# Patient Record
Sex: MALE | Race: OTHER | Employment: UNEMPLOYED | URBAN - METROPOLITAN AREA
[De-identification: names, ages, dates, MRNs, and addresses within clinical notes are randomized per-mention and may not be internally consistent; named-entity substitution may affect disease eponyms.]

---

## 2021-02-02 ENCOUNTER — APPOINTMENT (OUTPATIENT)
Dept: GENERAL RADIOLOGY | Facility: HOSPITAL | Age: 41
End: 2021-02-02
Attending: EMERGENCY MEDICINE

## 2021-02-02 ENCOUNTER — APPOINTMENT (OUTPATIENT)
Dept: CT IMAGING | Facility: HOSPITAL | Age: 41
End: 2021-02-02
Attending: EMERGENCY MEDICINE

## 2021-02-02 PROCEDURE — 70450 CT HEAD/BRAIN W/O DYE: CPT | Performed by: EMERGENCY MEDICINE

## 2021-02-02 PROCEDURE — 71045 X-RAY EXAM CHEST 1 VIEW: CPT | Performed by: EMERGENCY MEDICINE

## 2021-02-03 NOTE — ED NOTES
used to update patient on plan of care, informed he is to remain in the ED overnight d/t ETOH level.  Pt states he understands

## 2021-02-03 NOTE — ED INITIAL ASSESSMENT (HPI)
Pt arrived via ems, pt Persian speaking,  video phone used. Pt reports etoh tonight, denies injury/trauma. States he wants to go home. Using his phone to call friend/family.

## 2021-02-03 NOTE — ED INITIAL ASSESSMENT (HPI)
Brought to hospital per EMS from [facility], was found on his back, states 7 beers. Denies any complaints.

## 2021-02-03 NOTE — ED NOTES
Assumed care of patient, Genexpert swab done and sent. Pt awake, speaking broken [place]

## 2021-02-03 NOTE — ED NOTES
Spoke with Ria in registration, will come and register patient so he can be escorted to [facility] to call a taxi. Ok per Dr. Darlene Tobias for patient to take a taxi. Pt is AAOX3 and has steady gait.

## 2021-02-03 NOTE — ED NOTES
With use of  patient was reevaluated, denies suicidal or homicidal ideations. Was offered evaluation and assistance for his alcohol abuse which he declined.   Patient is now clinically sober, slightly tachycardic and slightly hypertensive, mild

## 2021-02-03 NOTE — ED NOTES
Patient on cart easily redirectable. Patient remains on IV fluids but water provided as well as urinal bedside due to patients ETOH levels, unable to ambulate at this time.

## 2021-02-03 NOTE — ED PROVIDER NOTES
Patient Seen in: [facility] Emergency Department      History   Patient presents with:  Alcohol Intoxication    Stated Complaint: ETOH    HPI/Subjective:   HPI    History is somewhat limited. Attained via [employer] [place] .   Reportedly p Chest: No chest wall tenderness or bruising  Abdomen: Soft, nontender, nondistended. No bruising   back: No midline thoracic or lumbar tenderness. No bruising no flank tenderness  Extremities: No edema.   Neuro: Cranial nerves II through XII are intact SCREEN 7 W/OUT CONFIRMATION, URINE   SARS-COV-2/FLU A AND B/RSV BY PCR (GENEXPERT)     EKG    Rate, intervals and axes as noted on EKG Report.   Rate: 149  Rhythm: Sinus Rhythm  Reading: Abnormal due to rate no ischemic changes         Chest x-ray: Low lung

## 2024-11-17 ENCOUNTER — HOSPITAL ENCOUNTER (INPATIENT)
Facility: HOSPITAL | Age: 44
LOS: 12 days | Discharge: HOME | End: 2024-11-29
Attending: EMERGENCY MEDICINE | Admitting: INTERNAL MEDICINE

## 2024-11-17 ENCOUNTER — APPOINTMENT (OUTPATIENT)
Dept: CT IMAGING | Facility: HOSPITAL | Age: 44
End: 2024-11-17
Attending: EMERGENCY MEDICINE

## 2024-11-17 DIAGNOSIS — S00.83XA CONTUSION OF FACE, INITIAL ENCOUNTER: ICD-10-CM

## 2024-11-17 DIAGNOSIS — F10.920 ALCOHOLIC INTOXICATION WITHOUT COMPLICATION (HCC): ICD-10-CM

## 2024-11-17 DIAGNOSIS — S06.6XAA TRAUMATIC SUBARACHNOID HEMORRHAGE WITH UNKNOWN LOSS OF CONSCIOUSNESS STATUS, INITIAL ENCOUNTER (HCC): Primary | ICD-10-CM

## 2024-11-17 DIAGNOSIS — S02.2XXA CLOSED FRACTURE OF NASAL BONE, INITIAL ENCOUNTER: ICD-10-CM

## 2024-11-17 PROBLEM — F10.929 ALCOHOLIC INTOXICATION WITH COMPLICATION (HCC): Status: ACTIVE | Noted: 2024-11-17

## 2024-11-17 PROBLEM — E87.6 HYPOKALEMIA: Status: ACTIVE | Noted: 2024-11-17

## 2024-11-17 PROBLEM — D69.6 THROMBOCYTOPENIA (HCC): Status: ACTIVE | Noted: 2024-11-17

## 2024-11-17 LAB
ALBUMIN SERPL-MCNC: 3.8 G/DL (ref 3.2–4.8)
ALBUMIN/GLOB SERPL: 1 {RATIO} (ref 1–2)
ALP LIVER SERPL-CCNC: 130 U/L
ALT SERPL-CCNC: 58 U/L
AMPHET UR QL SCN: NEGATIVE
ANION GAP SERPL CALC-SCNC: 11 MMOL/L (ref 0–18)
ANTIBODY SCREEN: NEGATIVE
APTT PPP: 28.5 SECONDS (ref 23–36)
AST SERPL-CCNC: 117 U/L (ref ?–34)
ATRIAL RATE: 97 BPM
BASOPHILS # BLD AUTO: 0.09 X10(3) UL (ref 0–0.2)
BASOPHILS NFR BLD AUTO: 0.9 %
BENZODIAZ UR QL SCN: NEGATIVE
BILIRUB SERPL-MCNC: 0.6 MG/DL (ref 0.3–1.2)
BUN BLD-MCNC: 8 MG/DL (ref 9–23)
CALCIUM BLD-MCNC: 8.6 MG/DL (ref 8.7–10.4)
CANNABINOIDS UR QL SCN: NEGATIVE
CHLORIDE SERPL-SCNC: 105 MMOL/L (ref 98–112)
CO2 SERPL-SCNC: 23 MMOL/L (ref 21–32)
COCAINE UR QL: NEGATIVE
CREAT BLD-MCNC: 0.61 MG/DL
CREAT UR-SCNC: 58.7 MG/DL
EGFRCR SERPLBLD CKD-EPI 2021: 122 ML/MIN/1.73M2 (ref 60–?)
EOSINOPHIL # BLD AUTO: 0 X10(3) UL (ref 0–0.7)
EOSINOPHIL NFR BLD AUTO: 0 %
ERYTHROCYTE [DISTWIDTH] IN BLOOD BY AUTOMATED COUNT: 18 %
ETHANOL SERPL-MCNC: 379 MG/DL (ref ?–3)
FENTANYL UR QL SCN: NEGATIVE
GLOBULIN PLAS-MCNC: 3.9 G/DL (ref 2–3.5)
GLUCOSE BLD-MCNC: 103 MG/DL (ref 70–99)
GLUCOSE BLD-MCNC: 98 MG/DL (ref 70–99)
HCT VFR BLD AUTO: 34.1 %
HGB BLD-MCNC: 11.7 G/DL
IMM GRANULOCYTES # BLD AUTO: 0.03 X10(3) UL (ref 0–1)
IMM GRANULOCYTES NFR BLD: 0.3 %
INR BLD: 0.99 (ref 0.8–1.2)
LYMPHOCYTES # BLD AUTO: 0.43 X10(3) UL (ref 1–4)
LYMPHOCYTES NFR BLD AUTO: 4.3 %
MCH RBC QN AUTO: 29.7 PG (ref 26–34)
MCHC RBC AUTO-ENTMCNC: 34.3 G/DL (ref 31–37)
MCV RBC AUTO: 86.5 FL
MDMA UR QL SCN: NEGATIVE
MONOCYTES # BLD AUTO: 0.72 X10(3) UL (ref 0.1–1)
MONOCYTES NFR BLD AUTO: 7.2 %
NEUTROPHILS # BLD AUTO: 8.67 X10 (3) UL (ref 1.5–7.7)
NEUTROPHILS # BLD AUTO: 8.67 X10(3) UL (ref 1.5–7.7)
NEUTROPHILS NFR BLD AUTO: 87.3 %
OPIATES UR QL SCN: NEGATIVE
OSMOLALITY SERPL CALC.SUM OF ELEC: 287 MOSM/KG (ref 275–295)
OXYCODONE UR QL SCN: NEGATIVE
P-R INTERVAL: 106 MS
PLATELET # BLD AUTO: 76 10(3)UL (ref 150–450)
POTASSIUM SERPL-SCNC: 3.2 MMOL/L (ref 3.5–5.1)
PROT SERPL-MCNC: 7.7 G/DL (ref 5.7–8.2)
PROTHROMBIN TIME: 13.2 SECONDS (ref 11.6–14.8)
Q-T INTERVAL: 400 MS
QRS DURATION: 88 MS
QTC CALCULATION (BEZET): 508 MS
R AXIS: 166 DEGREES
RBC # BLD AUTO: 3.94 X10(6)UL
RH BLOOD TYPE: POSITIVE
RH BLOOD TYPE: POSITIVE
SODIUM SERPL-SCNC: 139 MMOL/L (ref 136–145)
T AXIS: 155 DEGREES
VENTRICULAR RATE: 97 BPM
WBC # BLD AUTO: 9.9 X10(3) UL (ref 4–11)

## 2024-11-17 PROCEDURE — 76377 3D RENDER W/INTRP POSTPROCES: CPT | Performed by: EMERGENCY MEDICINE

## 2024-11-17 PROCEDURE — 70486 CT MAXILLOFACIAL W/O DYE: CPT | Performed by: EMERGENCY MEDICINE

## 2024-11-17 PROCEDURE — 30233R1 TRANSFUSION OF NONAUTOLOGOUS PLATELETS INTO PERIPHERAL VEIN, PERCUTANEOUS APPROACH: ICD-10-PCS | Performed by: HOSPITALIST

## 2024-11-17 PROCEDURE — 99223 1ST HOSP IP/OBS HIGH 75: CPT | Performed by: INTERNAL MEDICINE

## 2024-11-17 PROCEDURE — 99291 CRITICAL CARE FIRST HOUR: CPT | Performed by: OTHER

## 2024-11-17 PROCEDURE — 70450 CT HEAD/BRAIN W/O DYE: CPT | Performed by: EMERGENCY MEDICINE

## 2024-11-17 PROCEDURE — HZ2ZZZZ DETOXIFICATION SERVICES FOR SUBSTANCE ABUSE TREATMENT: ICD-10-PCS | Performed by: HOSPITALIST

## 2024-11-17 RX ORDER — LORAZEPAM 2 MG/ML
2 INJECTION INTRAMUSCULAR
Status: DISCONTINUED | OUTPATIENT
Start: 2024-11-17 | End: 2024-11-21

## 2024-11-17 RX ORDER — ACETAMINOPHEN 325 MG/1
650 TABLET ORAL EVERY 4 HOURS PRN
Status: DISCONTINUED | OUTPATIENT
Start: 2024-11-17 | End: 2024-11-29

## 2024-11-17 RX ORDER — LORAZEPAM 2 MG/ML
3 INJECTION INTRAMUSCULAR
Status: DISCONTINUED | OUTPATIENT
Start: 2024-11-17 | End: 2024-11-21

## 2024-11-17 RX ORDER — DOXEPIN HYDROCHLORIDE 50 MG/1
1 CAPSULE ORAL DAILY
Status: DISCONTINUED | OUTPATIENT
Start: 2024-11-17 | End: 2024-11-29

## 2024-11-17 RX ORDER — LORAZEPAM 1 MG/1
2 TABLET ORAL
Status: DISCONTINUED | OUTPATIENT
Start: 2024-11-17 | End: 2024-11-17

## 2024-11-17 RX ORDER — LORAZEPAM 2 MG/ML
5 INJECTION INTRAMUSCULAR
Status: DISCONTINUED | OUTPATIENT
Start: 2024-11-17 | End: 2024-11-21

## 2024-11-17 RX ORDER — AMOXICILLIN AND CLAVULANATE POTASSIUM 400; 57 MG/5ML; MG/5ML
500 POWDER, FOR SUSPENSION ORAL 2 TIMES DAILY
Status: DISCONTINUED | OUTPATIENT
Start: 2024-11-17 | End: 2024-11-17

## 2024-11-17 RX ORDER — LORAZEPAM 2 MG/ML
4 INJECTION INTRAMUSCULAR EVERY 5 MIN PRN
Status: DISCONTINUED | OUTPATIENT
Start: 2024-11-17 | End: 2024-11-21

## 2024-11-17 RX ORDER — THIAMINE HYDROCHLORIDE 100 MG/ML
100 INJECTION, SOLUTION INTRAMUSCULAR; INTRAVENOUS ONCE
Status: COMPLETED | OUTPATIENT
Start: 2024-11-17 | End: 2024-11-17

## 2024-11-17 RX ORDER — LORAZEPAM 1 MG/1
1 TABLET ORAL
Status: DISCONTINUED | OUTPATIENT
Start: 2024-11-17 | End: 2024-11-21

## 2024-11-17 RX ORDER — LABETALOL HYDROCHLORIDE 5 MG/ML
10 INJECTION, SOLUTION INTRAVENOUS EVERY 10 MIN PRN
Status: DISCONTINUED | OUTPATIENT
Start: 2024-11-17 | End: 2024-11-29

## 2024-11-17 RX ORDER — LORAZEPAM 2 MG/ML
0.1 INJECTION INTRAMUSCULAR EVERY 5 MIN PRN
Status: DISCONTINUED | OUTPATIENT
Start: 2024-11-17 | End: 2024-11-17

## 2024-11-17 RX ORDER — MELATONIN
100 DAILY
Status: DISCONTINUED | OUTPATIENT
Start: 2024-11-18 | End: 2024-11-23

## 2024-11-17 RX ORDER — SODIUM CHLORIDE 9 MG/ML
INJECTION, SOLUTION INTRAVENOUS CONTINUOUS
Status: DISCONTINUED | OUTPATIENT
Start: 2024-11-17 | End: 2024-11-21

## 2024-11-17 RX ORDER — ONDANSETRON 2 MG/ML
4 INJECTION INTRAMUSCULAR; INTRAVENOUS EVERY 6 HOURS PRN
Status: DISCONTINUED | OUTPATIENT
Start: 2024-11-17 | End: 2024-11-29

## 2024-11-17 RX ORDER — LORAZEPAM 1 MG/1
1 TABLET ORAL
Status: DISCONTINUED | OUTPATIENT
Start: 2024-11-17 | End: 2024-11-17

## 2024-11-17 RX ORDER — AMOXICILLIN AND CLAVULANATE POTASSIUM 500; 125 MG/1; MG/1
500 TABLET, FILM COATED ORAL EVERY 12 HOURS SCHEDULED
Status: DISCONTINUED | OUTPATIENT
Start: 2024-11-17 | End: 2024-11-17

## 2024-11-17 RX ORDER — LORAZEPAM 2 MG/ML
1 INJECTION INTRAMUSCULAR
Status: DISCONTINUED | OUTPATIENT
Start: 2024-11-17 | End: 2024-11-21

## 2024-11-17 RX ORDER — HYDRALAZINE HYDROCHLORIDE 20 MG/ML
10 INJECTION INTRAMUSCULAR; INTRAVENOUS EVERY 2 HOUR PRN
Status: DISCONTINUED | OUTPATIENT
Start: 2024-11-17 | End: 2024-11-29

## 2024-11-17 RX ORDER — MUPIROCIN 20 MG/G
OINTMENT TOPICAL 2 TIMES DAILY
Status: DISCONTINUED | OUTPATIENT
Start: 2024-11-17 | End: 2024-11-29

## 2024-11-17 RX ORDER — LORAZEPAM 1 MG/1
2 TABLET ORAL
Status: DISCONTINUED | OUTPATIENT
Start: 2024-11-17 | End: 2024-11-21

## 2024-11-17 RX ORDER — FOLIC ACID 1 MG/1
1 TABLET ORAL DAILY
Status: DISCONTINUED | OUTPATIENT
Start: 2024-11-17 | End: 2024-11-29

## 2024-11-17 RX ORDER — ACETAMINOPHEN 650 MG/1
650 SUPPOSITORY RECTAL EVERY 4 HOURS PRN
Status: DISCONTINUED | OUTPATIENT
Start: 2024-11-17 | End: 2024-11-29

## 2024-11-17 RX ORDER — METOCLOPRAMIDE HYDROCHLORIDE 5 MG/ML
10 INJECTION INTRAMUSCULAR; INTRAVENOUS EVERY 8 HOURS PRN
Status: DISCONTINUED | OUTPATIENT
Start: 2024-11-17 | End: 2024-11-29

## 2024-11-17 RX ORDER — POTASSIUM CHLORIDE 1500 MG/1
40 TABLET, EXTENDED RELEASE ORAL ONCE
Status: COMPLETED | OUTPATIENT
Start: 2024-11-17 | End: 2024-11-17

## 2024-11-17 RX ORDER — LORAZEPAM 1 MG/1
3 TABLET ORAL
Status: DISCONTINUED | OUTPATIENT
Start: 2024-11-17 | End: 2024-11-21

## 2024-11-17 RX ORDER — LORAZEPAM 2 MG/ML
4 INJECTION INTRAMUSCULAR EVERY 30 MIN PRN
Status: DISCONTINUED | OUTPATIENT
Start: 2024-11-17 | End: 2024-11-21

## 2024-11-17 RX ORDER — LORAZEPAM 2 MG/ML
6 INJECTION INTRAMUSCULAR EVERY 10 MIN PRN
Status: DISCONTINUED | OUTPATIENT
Start: 2024-11-17 | End: 2024-11-21

## 2024-11-17 NOTE — ED PROVIDER NOTES
Patient Seen in: [facility] Emergency Department      History     Chief Complaint   Patient presents with    Trauma     Stated Complaint:     Subjective:   HPI      44-year-old male brought by EMS after he was found with head/facial injuries.  Per EMS report, bystanders found a pool of blood on the ground that the police track to the patient's apartment where they found him with the injuries.  Patient is Occitan-speaking and  used throughout the encounter.  Patient states he was drinking heavily last night and fell walking home from a bar.  Patient denies any other injury.  Denies visual changes.  Denies nausea or vomiting.  Denies neck or back pain.  Denies chest or abdominal pain.  Last tetanus unknown.    Objective:     History reviewed. No pertinent past medical history.           History reviewed. No pertinent surgical history.             Social History     Socioeconomic History    Marital status: Single   Tobacco Use    Smoking status: Never    Smokeless tobacco: Former   Substance and Sexual Activity    Alcohol use: Yes     Comment: Frequently    Drug use: Never                  Physical Exam     ED Triage Vitals [11/17/24 1019]   /90   Pulse 104   Resp 18   Temp 98.1 °F (36.7 °C)   Temp src Temporal   SpO2 97 %   O2 Device None (Room air)       Current Vitals:   Vital Signs  BP: 120/69  Pulse: 92  Resp: 18  Temp: 98.1 °F (36.7 °C)  Temp src: Temporal  MAP (mmHg): 84    Oxygen Therapy  SpO2: 97 %  O2 Device: None (Room air)        Physical Exam  Vitals and nursing note reviewed.   Constitutional:       General: He is not in acute distress.     Appearance: He is well-developed. He is not ill-appearing.   HENT:      Head: Normocephalic.      Comments: Abrasions and edema to the bridge of the nose, right cheek and upper lip  No intraoral lacerations noted  No subluxed or loose teeth     Mouth/Throat:      Mouth: Mucous membranes are moist.   Eyes:      General: No scleral icterus.      Extraocular Movements: Extraocular movements intact.      Conjunctiva/sclera: Conjunctivae normal.      Pupils: Pupils are equal, round, and reactive to light.   Musculoskeletal:      Cervical back: Normal range of motion and neck supple. No rigidity or tenderness.   Skin:     General: Skin is warm and dry.      Capillary Refill: Capillary refill takes less than 2 seconds.   Neurological:      Mental Status: He is alert and oriented to person, place, and time.      GCS: GCS eye subscore is 4. GCS verbal subscore is 5. GCS motor subscore is 6.   Psychiatric:         Mood and Affect: Mood normal.         Behavior: Behavior normal.             ED Course     Labs Reviewed   COMP METABOLIC PANEL (14) - Abnormal; Notable for the following components:       Result Value    Glucose 103 (*)     Potassium 3.2 (*)     BUN 8 (*)     Creatinine 0.61 (*)     Calcium, Total 8.6 (*)      (*)     ALT 58 (*)     Alkaline Phosphatase 130 (*)     Globulin  3.9 (*)     All other components within normal limits   CBC WITH DIFFERENTIAL WITH PLATELET - Abnormal; Notable for the following components:    RBC 3.94 (*)     HGB 11.7 (*)     HCT 34.1 (*)     PLT 76.0 (*)     Neutrophil Absolute Prelim 8.67 (*)     Neutrophil Absolute 8.67 (*)     Lymphocyte Absolute 0.43 (*)     All other components within normal limits   ETHYL ALCOHOL - Abnormal; Notable for the following components:    Ethyl Alcohol 379 (*)     All other components within normal limits   PROTHROMBIN TIME (PT) - Normal   PTT, ACTIVATED - Normal   DRUG SCREEN 8 W/OUT CONFIRMATION, URINE     EKG    Rate, intervals and axes as noted on EKG Report.  Rate: 97  Rhythm: Sinus Rhythm  Reading: Sinus rhythm with short KS, right axis deviation, prolonged QTc           CT FACIAL BONES (CPT=70486)    Result Date: 11/17/2024  CONCLUSION:   1. Depressed fractures of the anterior and left lateral nasal bones with minimal displacement.  Given moderate paranasal soft tissue swelling,  these fractures are likely acute.  Rightward deviation of the nasal septum without evidence of septal fracture.  2. Linear lucency traverses the left zygomatic arch.  This has a somewhat corticated margin such that this may represent a chronic injury.  Correlate with localized tenderness at this site.  3. Thin linear lucency traversing the maxillary alveolar ridge anteriorly, just left of midline.  This is suspicious for an acute nondisplaced fracture.  This traverses the root of the left central incisor.   LOCATION:  [id number]   Dictated by (CST): Rand Michael MD on 11/17/2024 at 12:16 PM     Finalized by (CST): aRnd Michael MD on 11/17/2024 at 12:23 PM       CT BRAIN OR HEAD (CPT=70450)    Result Date: 11/17/2024  CONCLUSION:  1. Punctate focus of subarachnoid hemorrhage along the paramedian right frontal lobe. 2. Partially visualized nasal bone fracture, and opacification of the right maxillary sinus, please see separate report of CT facial bone.  COMMUNICATION:  The findings were communicated with Dr. Escalante at 12 10 on 11/17/2024. There was appropriate read back.   LOCATION:  [facility]   Dictated by (CST): Candelario Ngo MD on 11/17/2024 at 12:06 PM     Finalized by (CST): Candelario Ngo MD on 11/17/2024 at 12:11 PM          A total of 52 minutes of critical care time (exclusive of billable procedures) was administered to manage the patient's critical imaging findings and neurologic instability due to his subarachnoid hemorrhage.  This involved direct patient intervention, complex decision making, and/or extensive discussions with the patient, family, and clinical staff.       MDM        44-year-old male brought by EMS after he was found with head/facial injuries.  Per EMS report, bystanders found a pool of blood on the ground that the police track to the patient's apartment where they found him with the injuries.      Differential includes but is not limited to facial contusions, facial bone fracture, blunt head  injury, ICH    Independent interpretation of CT brain shows small subarachnoid hemorrhage.  Discussed with radiologist and states is located in the right frontal paramedian area.  CT facial bones show fractures of the anterior left lateral nasal bones along with soft tissue swelling.    Labs show alcohol intoxication with EtOH of 3.79.  Labs also showed mild anemia.  Coags are normal.  LFTs are mildly elevated likely due to alcohol abuse.  Patient denies any abdominal pain and has no tenderness.    Discussed with neurosurgery Dr. Bermudez recommends monitoring and see in ICU.  No seizure prophylaxis needed.  Discussed with neurocritical care Dr. Germain.  Discussed with hospitalist Dr. Blackmon.                Admission disposition: 11/17/2024 12:20 PM               Medical Decision Making  Amount and/or Complexity of Data Reviewed  Labs: ordered. Decision-making details documented in ED Course.  Radiology: ordered and independent interpretation performed. Decision-making details documented in ED Course.  Discussion of management or test interpretation with external provider(s): Neurosurgery, radiology, hospitalist, neurocritical care    Risk  Decision regarding hospitalization.        Disposition and Plan     Clinical Impression:  1. Traumatic subarachnoid hemorrhage with unknown loss of consciousness status, initial encounter ([facility])    2. Contusion of face, initial encounter    3. Closed fracture of nasal bone, initial encounter    4. Alcoholic intoxication without complication ([facility])         Disposition:  Admit  11/17/2024 12:20 pm    Follow-up:  No follow-up provider specified.        Medications Prescribed:  There are no discharge medications for this patient.          Supplementary Documentation:         Hospital Problems       Present on Admission             ICD-10-CM Noted POA    * (Principal) Traumatic subarachnoid hemorrhage with unknown loss of consciousness status, initial encounter ([facility]) S06.6XAA 11/17/2024  Unknown

## 2024-11-17 NOTE — CONSULTS
[facility]   NEUROCRITICAL CARE   CONSULT NOTE    Admission date: 11/17/2024  Reason for Consult: TBI  Chief Complaint:   Chief Complaint   Patient presents with    Trauma   ________________________________________________________________    History     History of Presenting Illness  44 year old male with unknown PMH here with traumatic subarachnoid hemorrhage.  Per EMR, police found a pool of blood and cell phone outside that they were able to track back to the patient's residence where a roommate told them that he was drinking the night prior.  Given facial trauma noted patient was brought into the ED for evaluation.  CT scan demonstrated small right frontal cortical subarachnoid hemorrhage.  Neurosurgery was consulted and recommended ICU admission.  Discussed with the ED physician.  Blood pressure within parameters.  No history of anticoagulation or antiplatelet use.  Coags within normal limits. Plt 76k. Arrived to unit with active blood dripping down right side of face.  Consulted ENT.  Platelet transfusion ordered.    Used  15204.  Patient states he drinks 1 beer a day but yesterday drank a \"whole bottle.\" Does not know what exactly happened or when it happened.  Denies drinking alcohol today.  Denies history of withdrawals.  Denies any focal neurologic deficits at this time.    History reviewed. No pertinent past medical history.  History reviewed. No pertinent surgical history.  Social History     Socioeconomic History    Marital status: Single   Tobacco Use    Smoking status: Never    Smokeless tobacco: Former   Substance and Sexual Activity    Alcohol use: Yes     Comment: Frequently    Drug use: Never     No family history on file.  Allergies Allergies[1]    Home Meds  No current outpatient medications  Scheduled Meds:  Continuous Infusions:  PRN Meds:    OBJECTIVE   VITAL SIGNS:   Temp:  [98.1 °F (36.7 °C)] 98.1 °F (36.7 °C)  Pulse:  [104] 104  Resp:  [18] 18  BP: (139)/(90)  139/90  SpO2:  [97 %] 97 %    INTAKE/OUTPUT:  No intake or output data in the 24 hours ending 11/17/24 1310    PHYSICAL EXAM:  GENERAL APPEARANCE: Patient resting comfortably in bed, NAD, poor hygiene  HEENT: Normocephalic, multiple facial contusions, active blood running down side of face.  Upper lip edematous and bruised  LUNGS: Normal respiratory rate and effort   HEART: Regular rate and rhythm   ABDOMEN: Soft. No tenderness, guarding, or rebound.     NEUROLOGIC:    Mental Status:  A&O x 4, Follows simple commands, no obvious aphasia, mild dysarthria  Cranial nerves: PERRL.  Visual fields full.  EOMI. right facial asymmetry given edema.  Hearing grossly intact. Tongue midline with normal movements.   Motor: Tremulous throughout. Drift:  Absent; Motor exam is 5 out of 5 in all extremities bilaterally  Sensation: Intact to light touch bilaterally  Cerebellar: Normal Finger-To-Nose test    LABORATORY DATA:  Last 24 hour labs were reviewed in detail.  Recent Labs   Lab 11/17/24  1224      K 3.2*      CO2 23.0   *   BUN 8*   CREATSERUM 0.61*     Recent Labs   Lab 11/17/24  1224   WBC 9.9   HGB 11.7*     Recent Labs   Lab 11/17/24  1224   ALT 58*   *     No results for input(s): \"MG\", \"PHOS\" in the last 168 hours.    Radiology:    CT FACIAL BONES (CPT=70486)    Result Date: 11/17/2024  CONCLUSION:   1. Depressed fractures of the anterior and left lateral nasal bones with minimal displacement.  Given moderate paranasal soft tissue swelling, these fractures are likely acute.  Rightward deviation of the nasal septum without evidence of septal fracture.  2. Linear lucency traverses the left zygomatic arch.  This has a somewhat corticated margin such that this may represent a chronic injury.  Correlate with localized tenderness at this site.  3. Thin linear lucency traversing the maxillary alveolar ridge anteriorly, just left of midline.  This is suspicious for an acute nondisplaced fracture.  This  traverses the root of the left central incisor.   LOCATION:  [id number]   Dictated by (CST): Rand Michael MD on 11/17/2024 at 12:16 PM     Finalized by (CST): Rand Michael MD on 11/17/2024 at 12:23 PM       CT BRAIN OR HEAD (CPT=70450)    Result Date: 11/17/2024  CONCLUSION:  1. Punctate focus of subarachnoid hemorrhage along the paramedian right frontal lobe. 2. Partially visualized nasal bone fracture, and opacification of the right maxillary sinus, please see separate report of CT facial bone.  COMMUNICATION:  The findings were communicated with Dr. Escalante at 12 10 on 11/17/2024. There was appropriate read back.   LOCATION:  [facility]   Dictated by (CST): Candelario Ngo MD on 11/17/2024 at 12:06 PM     Finalized by (CST): Candelario Ngo MD on 11/17/2024 at 12:11 PM      ASSESSMENT/PLAN   44 year old male unknown PMH here with traumatic subarachnoid hemorrhage.      Neurological:  Traumatic right frontal subarachnoid hemorrhage   - Initial CT with above   - INR 0.99, plt 76, no history of anticoagulation or antiplatelet use.   - Nsg following, appreciate recs    - Getting platelet transfusion, goal greater than 100 K   - No need for antiseizure prophylaxis given size of traumatic injury   - Repeat CT in AM     - PT/OT evals    EtOH abuse   -Drinking last night per EMR, alcohol level 379 on admission   -UDS Pending    -CIWA monitoring   -Thiamine and folic acid    Cardiovascular:   - SBP goal < 150   - IV labetalol/hydralazine pushes and Nicardipine gtt prn      Pulmonary:  - Satting well on RA, encourage IS     Renal:         - Monitor I&Os          - IVF while NPO    Gastrointestinal:  Nutrition:        - NPO        - Bowel regimen: ordered prn    Infectious Disease: - No leukocytosis, afebrile, continue to monitor     Heme/Onc   Anemia - Hb goal > 7, continue to monitor daily     Thrombocytopenia         - Likely in setting of chronic alcohol use, goal >100k        - Giving platelet transfusion now given ongoing facial  bleeding        - ENT consulted for facial fractures and ongoing bleed    Endocrine: - Accuchecks q6 with SSI prn while NPO     Checklist   - Lines: PIVs   - DVT Prophylaxis: SCDs   - Diet: NPO   - IVF: NS   - Electrolytes: Replete per protocol   - Code Status: FULL     Dispo: CNICU    Greater than 40 minutes of critical care time (exclusive of billable procedures) was administered to manage and/or prevent neurologic instability. This involved direct patient intervention, complex decision making, and/or extensive discussions with the patient, family, and clinical staff.    Gomez Archer MD  Neurocritical Care  [facility]    Disclaimer: This record was dictated using Dragon software. There may be errors due to voice recognition problems that were not realized and corrected during the completion of the note.         [1] No Known Allergies

## 2024-11-17 NOTE — ED INITIAL ASSESSMENT (HPI)
Pt arrives to ER with facial trauma and swelling. Pt states that he fell outside a bar last night, got up, and walked home to sleep.    Police were called for a pool of blood and a cell phone that was on the sidewalk. They used the phone and traced the blood back to his residence where his roommate stated he got home from the bar last night acting normally. Medics were called due to his presentation of facial trauma and they brought him to the ER.

## 2024-11-17 NOTE — H&P
[facility]     Amado Medina Patient Status:  Inpatient    3/22/1980 MRN [id number]   Location [facility] 6NE-A Attending Abi Blackmon MD   Hosp Day # 0 PCP None Pcp     Chief Complaint:  fall     Subjective:    History of Present Illness:     Amado Medina is a 44 year old male Who is Moldovan-speaking and an  has been used to obtain history, with no underlying past medical history presented to the emergency room after he was found facedown near his apartment.  Per patient he had been drinking tequila and beer last night and fell near his home.  He denies any headache, nausea, vomiting, chest pain or shortness of breath.  He repeatedly says \"everything is fine I am okay\" .    History/Other:    Past Medical History:  History reviewed. No pertinent past medical history.  Past Surgical History:   History reviewed. No pertinent surgical history.   Family History:   No family history on file.  Social History:    reports that he has never smoked. He has quit using smokeless tobacco. He reports current alcohol use. He reports that he does not use drugs.     Allergies: Allergies[1]    Medications:  Medications Ordered Prior to Encounter[2]    Review of Systems:   A comprehensive review of systems was completed.    Pertinent positives and negatives noted in the HPI.    Objective:   Physical Exam:    /86   Pulse 85   Temp 100.4 °F (38 °C)   Resp 25   SpO2 97%   General: No acute distress, Alert  Respiratory: No rhonchi, no wheezes  Cardiovascular: S1, S2. Regular rate and rhythm  Abdomen: Soft, Non-tender, non-distended, positive bowel sounds  Neuro: No new focal deficits  Extremities: No edema      Results:    Labs:      Labs Last 24 Hours:    Recent Labs   Lab 24  1224   RBC 3.94*   HGB 11.7*   HCT 34.1*   MCV 86.5   MCH 29.7   MCHC 34.3   RDW 18.0   NEPRELIM 8.67*   WBC 9.9   PLT 76.0*       Recent Labs   Lab 24  1224   *   BUN  8*   CREATSERUM 0.61*   EGFRCR 122   CA 8.6*   ALB 3.8      K 3.2*      CO2 23.0   ALKPHO 130*   *   ALT 58*   BILT 0.6   TP 7.7       Lab Results   Component Value Date    INR 0.99 11/17/2024       No results for input(s): \"TROP\", \"TROPHS\", \"CK\" in the last 168 hours.    No results for input(s): \"TROP\", \"PBNP\" in the last 168 hours.    No results for input(s): \"PCT\" in the last 168 hours.    Imaging: Imaging data reviewed in Epic.    Assessment & Plan:      # traumatic right frontal SAH  - CNICU  - CT Head in am   - Plt transfusion now   - defer AED ppx to neuro team  - PT OT     # s/p fall   # ETOH intoxication   - CIWA   - IVF  - replace lyte     # Thrombocytopenia   #Hypokalemia   # hyponatremia   # elevated liver enzymes due to ETOH hepatitis   # nasal bone fracture,  - wound care  - pain control       Plan of care discussed with patient and ER.     Abi Blackmon MD    Supplementary Documentation:     The 21st Century Cures Act makes medical notes like these available to patients in the interest of transparency. Please be advised this is a medical document. Medical documents are intended to carry relevant information, facts as evident, and the clinical opinion of the practitioner. The medical note is intended as peer to peer communication and may appear blunt or direct. It is written in medical language and may contain abbreviations or verbiage that are unfamiliar.                                       [1] No Known Allergies  [2]   No current facility-administered medications on file prior to encounter.     No current outpatient medications on file prior to encounter.

## 2024-11-18 ENCOUNTER — APPOINTMENT (OUTPATIENT)
Dept: CT IMAGING | Facility: HOSPITAL | Age: 44
End: 2024-11-18
Attending: INTERNAL MEDICINE

## 2024-11-18 LAB
ALBUMIN SERPL-MCNC: 3.9 G/DL (ref 3.2–4.8)
ALBUMIN/GLOB SERPL: 1.3 {RATIO} (ref 1–2)
ALP LIVER SERPL-CCNC: 133 U/L
ALT SERPL-CCNC: 49 U/L
ANION GAP SERPL CALC-SCNC: 10 MMOL/L (ref 0–18)
ANION GAP SERPL CALC-SCNC: 8 MMOL/L (ref 0–18)
AST SERPL-CCNC: 118 U/L (ref ?–34)
BILIRUB SERPL-MCNC: 1.2 MG/DL (ref 0.3–1.2)
BLOOD TYPE BARCODE: 5100
BUN BLD-MCNC: 7 MG/DL (ref 9–23)
BUN BLD-MCNC: 8 MG/DL (ref 9–23)
CALCIUM BLD-MCNC: 8.2 MG/DL (ref 8.7–10.4)
CALCIUM BLD-MCNC: 8.3 MG/DL (ref 8.7–10.4)
CHLORIDE SERPL-SCNC: 98 MMOL/L (ref 98–112)
CHLORIDE SERPL-SCNC: 98 MMOL/L (ref 98–112)
CO2 SERPL-SCNC: 22 MMOL/L (ref 21–32)
CO2 SERPL-SCNC: 25 MMOL/L (ref 21–32)
CREAT BLD-MCNC: 0.56 MG/DL
CREAT BLD-MCNC: 0.69 MG/DL
EGFRCR SERPLBLD CKD-EPI 2021: 118 ML/MIN/1.73M2 (ref 60–?)
EGFRCR SERPLBLD CKD-EPI 2021: 125 ML/MIN/1.73M2 (ref 60–?)
ERYTHROCYTE [DISTWIDTH] IN BLOOD BY AUTOMATED COUNT: 18.3 %
GLOBULIN PLAS-MCNC: 3.1 G/DL (ref 2–3.5)
GLUCOSE BLD-MCNC: 120 MG/DL (ref 70–99)
GLUCOSE BLD-MCNC: 133 MG/DL (ref 70–99)
GLUCOSE BLD-MCNC: 140 MG/DL (ref 70–99)
GLUCOSE BLD-MCNC: 94 MG/DL (ref 70–99)
HCT VFR BLD AUTO: 31.8 %
HGB BLD-MCNC: 10.6 G/DL
MCH RBC QN AUTO: 29.4 PG (ref 26–34)
MCHC RBC AUTO-ENTMCNC: 33.3 G/DL (ref 31–37)
MCV RBC AUTO: 88.3 FL
OSMOLALITY SERPL CALC.SUM OF ELEC: 268 MOSM/KG (ref 275–295)
OSMOLALITY SERPL CALC.SUM OF ELEC: 272 MOSM/KG (ref 275–295)
PLATELET # BLD AUTO: 83 10(3)UL (ref 150–450)
PLATELETS.RETICULATED NFR BLD AUTO: 10.2 % (ref 0–7)
POTASSIUM SERPL-SCNC: 3.4 MMOL/L (ref 3.5–5.1)
POTASSIUM SERPL-SCNC: 3.6 MMOL/L (ref 3.5–5.1)
POTASSIUM SERPL-SCNC: 3.6 MMOL/L (ref 3.5–5.1)
PROT SERPL-MCNC: 7 G/DL (ref 5.7–8.2)
RBC # BLD AUTO: 3.6 X10(6)UL
SODIUM SERPL-SCNC: 130 MMOL/L (ref 136–145)
SODIUM SERPL-SCNC: 131 MMOL/L (ref 136–145)
UNIT VOLUME: 202 ML
WBC # BLD AUTO: 7.8 X10(3) UL (ref 4–11)

## 2024-11-18 PROCEDURE — 70450 CT HEAD/BRAIN W/O DYE: CPT | Performed by: INTERNAL MEDICINE

## 2024-11-18 PROCEDURE — 99291 CRITICAL CARE FIRST HOUR: CPT | Performed by: INTERNAL MEDICINE

## 2024-11-18 PROCEDURE — 99222 1ST HOSP IP/OBS MODERATE 55: CPT | Performed by: NEUROLOGICAL SURGERY

## 2024-11-18 PROCEDURE — 99232 SBSQ HOSP IP/OBS MODERATE 35: CPT | Performed by: INTERNAL MEDICINE

## 2024-11-18 RX ORDER — MINERAL OIL/PETROLATUM,WHITE
CREAM (GRAM) TOPICAL 2 TIMES DAILY
Status: DISCONTINUED | OUTPATIENT
Start: 2024-11-18 | End: 2024-11-29

## 2024-11-18 RX ORDER — DEXMEDETOMIDINE HYDROCHLORIDE 4 UG/ML
INJECTION, SOLUTION INTRAVENOUS
Status: DISPENSED
Start: 2024-11-18 | End: 2024-11-18

## 2024-11-18 RX ORDER — DEXMEDETOMIDINE HYDROCHLORIDE 4 UG/ML
INJECTION, SOLUTION INTRAVENOUS CONTINUOUS
Status: DISCONTINUED | OUTPATIENT
Start: 2024-11-18 | End: 2024-11-22

## 2024-11-18 NOTE — PLAN OF CARE
Assumed patient care at 0730.  Patient tremulous but neurologically intact during bedside shift report.  As the morning went on, patient's withdrawal symptoms increased and patient became very restless/agitated.  See flowsheets and EMAR for CIWA records.  Patient needed restrained for safety due to constantly removing medical equipment and trying to get out of bed as well as trying to eat his linens.    Problem: Patient/Family Goals  Goal: Patient/Family Long Term Goal  Description: Patient's Long Term Goal: Discharge with adequate resources     Interventions:  - Assistance from CM/SW if needed  - See additional Care Plan goals for specific interventions  Outcome: Progressing  Goal: Patient/Family Short Term Goal  Description: Patient's Short Term Goal: Remain comfortable    Interventions:   - PRN medications   -Non-pharmacological (Ice packs)  - See additional Care Plan goals for specific interventions  Outcome: Progressing     Problem: METABOLIC/FLUID AND ELECTROLYTES - ADULT  Goal: Electrolytes maintained within normal limits  Description: INTERVENTIONS:  - Monitor labs and rhythm and assess patient for signs and symptoms of electrolyte imbalances  - Administer electrolyte replacement as ordered  - Monitor response to electrolyte replacements, including rhythm and repeat lab results as appropriate  - Fluid restriction as ordered  - Instruct patient on fluid and nutrition restrictions as appropriate  Outcome: Progressing     Problem: HEMATOLOGIC - ADULT  Goal: Free from bleeding injury  Description: (Example usage: patient with low platelets)  INTERVENTIONS:  - Avoid intramuscular injections, enemas and rectal medication administration  - Ensure safe mobilization of patient  - Hold pressure on venipuncture sites to achieve adequate hemostasis  - Assess for signs and symptoms of internal bleeding  - Monitor lab trends  - Patient is to report abnormal signs of bleeding to staff  - Avoid use of toothpicks and dental  floss  - Use electric shaver for shaving  - Use soft bristle tooth brush  - Limit straining and forceful nose blowing  Outcome: Progressing     Problem: Safety Risk - Non-Violent Restraints  Goal: Patient will remain free from self-harm  Description: INTERVENTIONS:  - Apply the least restrictive restraint to prevent harm  - Notify patient and family of reasons restraints applied  - Assess for any contributing factors to confusion (electrolyte disturbances, delirium, medications)  - Discontinue any unnecessary medical devices as soon as possible  - Assess the patient's physical comfort, circulation, skin condition, hydration, nutrition and elimination needs   - Reorient and redirection as needed  - Assess for the need to continue restraints  Outcome: Progressing

## 2024-11-18 NOTE — PROGRESS NOTES
[facility]   part of [facility]     Hospitalist Progress Note     Amado Medina Patient Status:  Inpatient    3/22/1980 MRN [id number]   Location [facility] 6NE-A Attending Abi Blackmon MD   Hosp Day # 1 PCP None Pcp     Chief Complaint:I am fine      Subjective:     Patient in the chair. No overnight issues. Weaning prece dex.     Objective:    Review of Systems:   A comprehensive review of systems was completed; pertinent positive and negatives stated in subjective.    Vital signs:  Temp:  [98.2 °F (36.8 °C)-100.5 °F (38.1 °C)] 98.7 °F (37.1 °C)  Pulse:  [] 86  Resp:  [17-31] 17  BP: ()/(63-98) 104/76  SpO2:  [72 %-99 %] 95 %    Physical Exam:    General: No acute distress  Respiratory: No wheezes, no rhonchi  Cardiovascular: S1, S2, regular rate and rhythm  Abdomen: Soft, Non-tender, non-distended, positive bowel sounds  Neuro: No new focal deficits.   Extremities: No edema      Diagnostic Data:    Labs:  Recent Labs   Lab 24  1224 24  0443   WBC 9.9 7.8   HGB 11.7* 10.6*   MCV 86.5 88.3   PLT 76.0* 83.0*   INR 0.99  --        Recent Labs   Lab 24  1224 24  0443 24  1103   * 94 120*   BUN 8* 7* 8*   CREATSERUM 0.61* 0.56* 0.69*   CA 8.6* 8.2* 8.3*   ALB 3.8 3.9  --     130* 131*   K 3.2* 3.6  3.6 3.4*    98 98   CO2 23.0 22.0 25.0   ALKPHO 130* 133*  --    * 118*  --    ALT 58* 49  --    BILT 0.6 1.2  --    TP 7.7 7.0  --        CrCl cannot be calculated (Unknown ideal weight.).    No results for input(s): \"TROP\", \"TROPHS\", \"CK\" in the last 168 hours.    Recent Labs   Lab 24  1224   PTP 13.2   INR 0.99                  Microbiology    No results found for this visit on 24.      Imaging: Reviewed in Epic.    Medications:    dexmedeTOMIDine in sodium chloride 0.9%        potassium chloride  40 mEq Intravenous Once    thiamine  100 mg Oral Daily    multivitamin  1 tablet Oral Daily    folic acid  1 mg Oral  Daily    mupirocin   Topical BID    amoxicillin potassium and clavulanate  875 mg Oral 2 times per day       Assessment & Plan:      # traumatic right frontal SAH  - once off precedex can tx out to CTU3  - CT Head stable   - PT OT      # s/p fall   # ETOH intoxication   - CIWA   - IVF     # Thrombocytopenia , due to BM suppression from ETOH most likely  - monitor   #Hypokalemia , replace   # hyponatremia   - monitor   # elevated liver enzymes due to ETOH hepatitis   # nasal bone fracture,  - wound care  - pain control   - ENT rec noted       Abi Blackmon MD    Supplementary Documentation:     Quality:  DVT Mechanical Prophylaxis:   SCDs,    DVT Pharmacologic Prophylaxis   Medication   None                Code Status: Full Code  Alvarado: External urinary catheter in place  Alvarado Duration (in days):   Central line:    DILLON:     Discharge is dependent on: course  At this point Mr. Medina is expected to be discharge to: home    The 21st Century Cures Act makes medical notes like these available to patients in the interest of transparency. Please be advised this is a medical document. Medical documents are intended to carry relevant information, facts as evident, and the clinical opinion of the practitioner. The medical note is intended as peer to peer communication and may appear blunt or direct. It is written in medical language and may contain abbreviations or verbiage that are unfamiliar.

## 2024-11-18 NOTE — PHYSICAL THERAPY NOTE
PT order received, chart reviewed, discussed case with RN who reports is he not appropriate to attempt out of bed mobility at this time.

## 2024-11-18 NOTE — CONSULTS
[facility]   part of [facility]    Neurosurgery Consult  2024    Amado Medina Patient Status:  Inpatient    3/22/1980 MRN [id number]   Location [facility] 6NE-A Attending Abi Blackmon MD   Hosp Day # 1 PCP None Pcp     REASON FOR CONSULT:    Traumatic subarachnoid hemorrhage    HISTORY OF PRESENT ILLNESS:  Amado Medina is a(n) 44 year old male who presents to the hospital after being found down.    REVIEW OF SYSTEMS:  Unable to perform due to altered mental status    ALLERGIES:  Allergies[1]    MEDICATIONS:  Prescriptions Prior to Admission[2]    HISTORY:  History reviewed. No pertinent past medical history.  History reviewed. No pertinent surgical history.  No family history on file.  Amado  reports that he has never smoked. He has quit using smokeless tobacco. He reports current alcohol use. He reports that he does not use drugs.    PHYSICAL EXAMINATION:  Vital Signs:  BP 93/73 (BP Location: Left arm)   Pulse 77   Temp 97 °F (36.1 °C) (Temporal)   Resp 13   Wt 136 lb 0.4 oz (61.7 kg)   SpO2 94%     On examination, the patient is restrained.  He is on a Precedex drip.  He is arousable to voice and mechanical stimulation.  He moves all extremities purposefully but does not follow commands.    REVIEW OF STUDIES:  CT scan reviewed yesterday, and repeat scan reviewed today.  This demonstrates a small punctate hyperdensity along the mesial right frontal lobe, which is less conspicuous on today's scan.  Likely small traumatic subarachnoid hemorrhage.      ASSESSMENT AND PLAN:  1.  Traumatic subarachnoid hemorrhage, resolving.  No indication for surgical intervention.    The patient has been placed on CIWA protocol for alcohol withdrawal.    Neurosurgery will sign off.  Recommend outpatient follow-up in [facility] in 2 weeks, with repeat head CT.    Please call if any further questions arise.        2024  4:54 PM      This report was dictated using voice recognition  technology.  Errors in syntax or recognition may occur, and should not be construed to change the meaning of a sentence.          [1] No Known Allergies  [2]   No medications prior to admission.

## 2024-11-18 NOTE — DISCHARGE INSTRUCTIONS
General Medical Follow up:  [facility] [employer] (healthcare clinic) [email or web address].Verified Identity Pass  [facility] welcomes patients with Medicaid, Medicare, private insurance or no insurance at all, possibly at a discounted rate. [facility] offers low cost prescriptions drugs when seen by a [facility] physician.   [facility] - Primary Care/[facility] [address], [place] [id number] [phone]  [facility] [address], [place] [address] [phone]  [facility] [address] [place], [place] [id number] [phone]  [facility] [address], [address] [place] [address]  Leeann pickard issac aqui o llame al teléfono [phone] o al [phone].     [facility] provides care for chronic disease management, and offers coordinated, patient-centered care.  Their services include adult health, pediatric health, women's health, dental services, behavioral health services and LGBTQ+ health.  [facility] works to eliminate any barriers that may keep our patients from accessing services. We do this by providing 24-hour access to providers, comprehensive care management, transportation assistance, access to reduced-price pharmaceuticals, on-site laboratories, same and next-day appointments, bilingual staff and translation services.    [facility]- [address] [facility]- [address], [address], [facility]- [address] [facility]- [address] [facility]- [address] [facility] [facility], [address], [facility]- [address] [facility]- [address] [facility]- [address], [facility]- [address], [facility]- [address], [place], [place]     [email or web address]  Main number [phone]    Visit [employer]Verified Identity Pass for discounted  prescription drug coupons or [employer] for $4 prescriptions [email or web address]://[email or web address]    To inquire about [place], call [facility] [phone] or visit [email or web address]    Healthcare.gov - Marketplace insurance    The Extra Help Program: is designed to help eligible Medicare Part D patients with high out of pocket costs. Contact this program directly by calling [employer] at 1-592.178.8323    Through the Medicare Plan Finder, you can search to find more information about cost and your specific prescription coverage.  www.medicare.gov/find-a-plan.    [facility] Health Care Program (only apply if you do not Qualify for medicaid)  Access [facility]  [address]; [address]  [place]  Phone: [phone]  [email or web address]    [facility] [address] [address]  [phone]  [email or web address]    Questions about financial assistance, application, or uninsured discounts can be directed to [phone]    Marketplace insurance comparisons and costs  HealthCare.gov or (929-475-4319)      Alcoholics Anonymous (AA)  English Version  [facility]  [address] [place] [address]  Dates/Times (Daily at 7 PM)     Alcohólicos Anónimos (AA)  Versión en español  [facility]  [address] [place] [address]  Fechas/Horas (Todos los días a las 7 p.m.)

## 2024-11-18 NOTE — CONSULTS
[facility]  Report of Inpatient Wound Care Consultation    Amado Medina Patient Status:  Inpatient    3/22/1980 MRN [id number]   Location [facility] 6NE-A Attending Abi Blackmon MD   Hosp Day # 1 PCP None Pcp     Reason for Consultation:  Facial wounds    History of Present Illness:  Amado Medina is a a(n) 44 year old male. Unable to assess pain level due to patient being asleep during evaluation and dressing change. Patient with multiple comorbidities, with skin breakdown described below.     History:  History reviewed. No pertinent past medical history.  History reviewed. No pertinent surgical history.   reports that he has never smoked. He has quit using smokeless tobacco. He reports current alcohol use. He reports that he does not use drugs.      Allergies:  @ALLERGY    Laboratory Data:    Recent Labs   Lab 24  1224 24  1548 24  0443 24  1103 24  1144   WBC 9.9  --  7.8  --   --    HGB 11.7*  --  10.6*  --   --    HCT 34.1*  --  31.8*  --   --    PLT 76.0*  --  83.0*  --   --    CREATSERUM 0.61*  --  0.56* 0.69*  --    BUN 8*  --  7* 8*  --    *  --  94 120*  --    CA 8.6*  --  8.2* 8.3*  --    ALB 3.8  --  3.9  --   --    TP 7.7  --  7.0  --   --    PTT 28.5  --   --   --   --    INR 0.99  --   --   --   --    ETOH 379*  --   --   --   --    PGLU  --  98  --   --  140*         ASSESSMENT:  Wound Nose (Active)   No Date First Assessed or Time First Assessed found.   Location: Nose      Assessments 2024  2:41 PM   Wound Image     Site Assessment Purple;Edema   Drainage Amount Scant   Drainage Description Serosanguineous   Treatments Cleansed   Dressing Xeroform;Gauze   Wound Length (cm) 5 cm   Wound Width (cm) 4 cm   Wound Surface Area (cm^2) 20 cm^2   Wound Depth (cm) 0.1 cm   Wound Volume (cm^3) 2 cm^3   Margins Well-defined edges   Non-staged Wound Description Not applicable   Shape 100% abrasion, dried drainage with some scabbing      Edema : Non-pitting    Wound Cleaning and Dressings:  Wound cleansing:  normal saline  Wound cleaning frequency: Daily and PRN  Wound product: xeroform and gauze to nose ridge, Aquafor or vaseline to area above upper lip  Dressing change frequency:  Change dressing daily and/or PRN    ALL WOUND CARE SUPPLIES CAN BE OBTAINED FROM CENTRAL DISTRIBUTION     If patient is Diabetic: want to make sure blood sugars are within a controled range for wound healing     Protein intake: depending on providers recommendations and patients kidney functions - if kidneys are good then recommend patient to increase protein intake (Boost, Rebel, Ensure, Premiere Protein)     Thank you for this consultation and for allowing me to participate in the care of your patient.  Please call [phone] if you have any questions about this consultation and plan of care.     Time Spent 30 Minutes.    Thank you,  Pauline Johnson RN  Wound/Ostomy/Continence nurse    11/18/2024  4:00 PM

## 2024-11-18 NOTE — CONSULTS
[facility]  Report of Otolaryngology Consultation    Amado Medina Patient Status:  Inpatient    3/22/1980 MRN [id number]   Location [place] [address]NE-A Attending Abi Blackmon MD   Hosp Day # 1 PCP None Pcp     Reason for Consultation:  ***    History of Present Illness:  Amado Medina is a a(n) 44 year old male ***    History:  History reviewed. No pertinent past medical history.  History reviewed. No pertinent surgical history.    Family History:  No family history on file.    Social History:  Social History     Socioeconomic History    Marital status: Single     Spouse name: Not on file    Number of children: Not on file    Years of education: Not on file    Highest education level: Not on file   Occupational History    Not on file   Tobacco Use    Smoking status: Never    Smokeless tobacco: Former   Substance and Sexual Activity    Alcohol use: Yes     Comment: Frequently    Drug use: Never    Sexual activity: Not on file   Other Topics Concern    Not on file   Social History Narrative    Not on file     Social Drivers of Health     Financial Resource Strain: Not on file   Food Insecurity: No Food Insecurity (2024)    Food Insecurity     Food Insecurity: Never true   Transportation Needs: No Transportation Needs (2024)    Transportation Needs     Lack of Transportation: No     Car Seat: Not on file   Physical Activity: Not on file   Stress: Not on file   Social Connections: Not on file   Housing Stability: Low Risk  (2024)    Housing Stability     Housing Instability: No     Housing Instability Emergency: Not on file     Crib or Bassinette: Not on file        Allergies:  Allergies[1]    Medications:    Current Facility-Administered Medications:     dexmedeTOMIDine in sodium chloride 0.9% (Precedex) 400 mcg/100mL infusion premix, 0.2-1.5 mcg/kg/hr (Dosing Weight), Intravenous, Continuous    dexmedeTOMIDine in sodium chloride 0.9% (Precedex) 400 mcg/100mL infusion premix,  , ,     potassium chloride 40 mEq in 250mL sodium chloride 0.9% IVPB premix, 40 mEq, Intravenous, Once    water-oil emulsion (Eucirin) cream, , Topical, BID    sodium chloride 0.9% infusion, , Intravenous, Continuous    labetalol (Trandate) 5 mg/mL injection 10 mg, 10 mg, Intravenous, Q10 Min PRN    hydrALAzine (Apresoline) 20 mg/mL injection 10 mg, 10 mg, Intravenous, Q2H PRN    acetaminophen (Tylenol) tab 650 mg, 650 mg, Oral, Q4H PRN **OR** acetaminophen (Tylenol) rectal suppository 650 mg, 650 mg, Rectal, Q4H PRN    ondansetron (Zofran) 4 MG/2ML injection 4 mg, 4 mg, Intravenous, Q6H PRN **OR** metoclopramide (Reglan) 5 mg/mL injection 10 mg, 10 mg, Intravenous, Q8H PRN    thiamine (Vitamin B1) tab 100 mg, 100 mg, Oral, Daily    multivitamin (Tab-A-Gela/Beta Carotene) tab 1 tablet, 1 tablet, Oral, Daily    folic acid (Folvite) tab 1 mg, 1 mg, Oral, Daily    mupirocin (Bactroban) 2 % ointment, , Topical, BID    LORazepam (Ativan) 2 mg/mL injection 4 mg, 4 mg, Intravenous, Q5 Min PRN    amoxicillin clavulanate (Augmentin) 875-125 MG per tab 875 mg, 875 mg, Oral, 2 times per day    LORazepam (Ativan) tab 1 mg, 1 mg, Oral, Q1H PRN **OR** LORazepam (Ativan) 2 mg/mL injection 1 mg, 1 mg, Intravenous, Q1H PRN    LORazepam (Ativan) tab 2 mg, 2 mg, Oral, Q1H PRN **OR** LORazepam (Ativan) 2 mg/mL injection 2 mg, 2 mg, Intravenous, Q1H PRN    LORazepam (Ativan) tab 3 mg, 3 mg, Oral, Q1H PRN **OR** LORazepam (Ativan) 2 mg/mL injection 3 mg, 3 mg, Intravenous, Q1H PRN    LORazepam (Ativan) 2 mg/mL injection 4 mg, 4 mg, Intravenous, Q30 Min PRN    LORazepam (Ativan) 2 mg/mL injection 5 mg, 5 mg, Intravenous, Q15 Min PRN    LORazepam (Ativan) 2 mg/mL injection 6 mg, 6 mg, Intravenous, Q10 Min PRN    Review of Systems:  A complete 10-point review of systems was completed with the patient.  Pertinent positives are noted above in the HPI. ***    Physical Exam:  BP 93/73 (BP Location: Left arm)   Pulse 77   Temp 97 °F (36.1 °C)  (Temporal)   Resp 13   Wt 136 lb 0.4 oz (61.7 kg)   SpO2 94%     {[id number]}    Laboratory Data:  Lab Results   Component Value Date    WBC 7.8 11/18/2024    HGB 10.6 11/18/2024    HCT 31.8 11/18/2024    PLT 83.0 11/18/2024    CREATSERUM 0.69 11/18/2024    BUN 8 11/18/2024     11/18/2024    K 3.4 11/18/2024    CL 98 11/18/2024    CO2 25.0 11/18/2024     11/18/2024    CA 8.3 11/18/2024    ALB 3.9 11/18/2024    ALKPHO 133 11/18/2024    BILT 1.2 11/18/2024    TP 7.0 11/18/2024     11/18/2024    ALT 49 11/18/2024    PGLU 133 11/18/2024       Imaging:  ***    Assessment/Plan:  Patient Active Problem List   Diagnosis    Traumatic subarachnoid hemorrhage with unknown loss of consciousness status, initial encounter (HCC)    Alcoholic intoxication with complication (HCC)    Hypokalemia    Closed fracture of nasal bones    Thrombocytopenia (HCC)    Contusion of face, initial encounter    Closed fracture of nasal bone, initial encounter    Alcoholic intoxication without complication (HCC)       ***      Thank you for allowing me to participate in the care of your patient.    Pavel Winston MD  11/18/2024  4:36 PM       [1] No Known Allergies

## 2024-11-18 NOTE — SLP NOTE
ADULT SWALLOWING EVALUATION    ASSESSMENT    ASSESSMENT/OVERALL IMPRESSION:  Patient seen for swallowing evaluation per protocol. Patient admitted with traumatic SAH. Per EMR, police found a pool of blood and cell phone outside that they were able to track back to patient's residence where a roommate told them that he was drinking the night before. Given facial trauma, patient brought to ED for evaluation. Imaging revealed small R frontal cortical SAH. Patient also pending ENT consult given nasal fractures.     Oral mechanism exam remarkable for R facial edema which limited strength and ROM though difficult to assess tone. SLP assessed patient with thin liquids, solids and pills with water (provided by RN). Patient with difficutly with oral retrieval of thin liquids by straw due to labial injury. He demonstrated adequate containment once able to retrieve bolus. Mastication of solids was adequate. Oral prep and transit were adequate though prolonged. Laryngeal excursion judged to be of adequate strength and timeliness to palpation. There were no overt signs of aspiration noted.     Recommend continue regular consistency solids and thin liquids as tolerated observing aspiration precautions. SLP will continue to follow for ongoing assessment of po tolerance and completion of communication assessment as clinically appropriate. Discussed with RN.          RECOMMENDATIONS   Diet Recommendations - Solids: Regular  Diet Recommendations - Liquids: Thin Liquids                        Compensatory Strategies Recommended: Slow rate;Small bites and sips  Aspiration Precautions: Upright position;Slow rate;Small bites and sips  Medication Administration Recommendations:  (as tolerated)  Treatment Plan/Recommendations: Aspiration precautions;Communication evaluation    HISTORY   MEDICAL HISTORY  Reason for Referral:  (s/p fall with SAH and nasal fracture)    Problem List  Principal Problem:    Traumatic subarachnoid hemorrhage with  unknown loss of consciousness status, initial encounter ([facility])  Active Problems:    Alcoholic intoxication with complication ([facility])    Hypokalemia    Closed fracture of nasal bones    Thrombocytopenia (HCC)    Contusion of face, initial encounter    Closed fracture of nasal bone, initial encounter    Alcoholic intoxication without complication (HCC)      Past Medical History  History reviewed. No pertinent past medical history.       Diet Prior to Admission: Regular;Thin liquids  Precautions: Aspiration;Seizure    Patient/Family Goals: none stated    SWALLOWING HISTORY  Current Diet Consistency: Regular;Thin liquids  Dysphagia History: none documented  Imaging Results:   Brain CT from 11/18/24 revealed:  FINDINGS:    Preliminary reading provided by radiologist from [facility].  Slight decrease in conspicuity of focus of apparent subarachnoid blood in the anterior interhemispheric fissure to the right side image 28. No new area of hemorrhage.  No midline shift mass effect herniation hydrocephalus.  Cerebral atrophy pre  redemonstrated.  No sign of acute skull fracture, or acute sinusitis.  Partially visualized nasal bone deformities.                   Impression   CONCLUSION:  Slight decrease in conspicuity of subarachnoid hemorrhage.  No new bleed identified.           LOCATION:  [facility]        Dictated by (CST): Gallito Ozuna MD on 11/18/2024 at 8:18 AM      Finalized by (CST): Gallito Ozuna MD on 11/18/2024 at 8:19 AM         SUBJECTIVE       OBJECTIVE   ORAL MOTOR EXAMINATION  Dentition: Functional  Symmetry:  (R facial edema)  Strength: Reduced right facial (r/t R facial edema)  Tone:  (difficult to assess due to R facial edema)  Range of Motion: Reduced right facial  Rate of Motion: Reduced    Voice Quality: Clear  Respiratory Status: Unlabored  Consistencies Trialed: Thin liquids;Hard solid (pills with water)  Method of Presentation: Self presentation;Staff/Clinician assistance;Straw;Single  sips;Consecutive swallows  Patient Positioning: Upright;Midline (in bed)    Oral Phase of Swallow: Impaired  Bolus Retrieval: Impaired  Bilabial Seal: Impaired  Bolus Formation: Intact  Bolus Propulsion: Intact  Mastication: Intact  Retention: Intact    Pharyngeal Phase of Swallow: Within Functional Limits           (Please note: Silent aspiration cannot be evaluated clinically. Videofluoroscopic Swallow Study is required to rule-out silent aspiration.)    Esophageal Phase of Swallow: No complaints consistent with possible esophageal involvement              GOALS  Goal #1 The patient will tolerate regular consistency and thin liquids without overt signs or symptoms of aspiration with 100 % accuracy over 1-2 session(s).  In Progress   Goal #2 The patient/family/caregiver will demonstrate understanding and implementation of aspiration precautions and swallow strategies independently over 1-2 session(s).    In Progress   Goal #3 Patient will participate in communication assessment as clinically appropriate  In Progress     FOLLOW UP  Treatment Plan/Recommendations: Aspiration precautions;Communication evaluation  Number of Visits to Meet Established Goals: 2  Follow Up Needed (Documentation Required): Yes  SLP Follow-up Date: 11/19/24    Thank you for your referral.   If you have any questions, please contact Jermaine Duke MS CCC-SLP  Pager [phone]

## 2024-11-18 NOTE — PLAN OF CARE
Assumed care of patient at approximately 1930. Pt A&Ox4, quite tremulous. Neuro checks Q1h. CIWA protocol. Primarily Beninese speaking,  available  Pt maintaining saturations on room air . On telemetry,NSR/ST.  Pt reports c/o mild facial pain, PRN tylenol. Nasal Fx, bactroban applied. See photo below. Voids. Pt updated on plan of care and verbalized understanding.         Problem: Patient/Family Goals  Goal: Patient/Family Long Term Goal  Description: Patient's Long Term Goal: Discharge with adequate resources     Interventions:  - Assistance from CM/SW if needed  - See additional Care Plan goals for specific interventions  Outcome: Progressing  Goal: Patient/Family Short Term Goal  Description: Patient's Short Term Goal: Remain comfortable    Interventions:   - PRN medications   -Non-pharmacological (Ice packs)  - See additional Care Plan goals for specific interventions  Outcome: Progressing     Problem: METABOLIC/FLUID AND ELECTROLYTES - ADULT  Goal: Electrolytes maintained within normal limits  Description: INTERVENTIONS:  - Monitor labs and rhythm and assess patient for signs and symptoms of electrolyte imbalances  - Administer electrolyte replacement as ordered  - Monitor response to electrolyte replacements, including rhythm and repeat lab results as appropriate  - Fluid restriction as ordered  - Instruct patient on fluid and nutrition restrictions as appropriate  Outcome: Progressing     Problem: HEMATOLOGIC - ADULT  Goal: Free from bleeding injury  Description: (Example usage: patient with low platelets)  INTERVENTIONS:  - Avoid intramuscular injections, enemas and rectal medication administration  - Ensure safe mobilization of patient  - Hold pressure on venipuncture sites to achieve adequate hemostasis  - Assess for signs and symptoms of internal bleeding  - Monitor lab trends  - Patient is to report abnormal signs of bleeding to staff  - Avoid use of toothpicks and dental floss  - Use electric  shaver for shaving  - Use soft bristle tooth brush  - Limit straining and forceful nose blowing  Outcome: Progressing

## 2024-11-18 NOTE — PROGRESS NOTES
[facility]  Neurocritical Care Progress Note     Amado Medina Patient Status:  Inpatient    3/22/1980 MRN [id number]   Location [facility] 6NE-A Attending Abi Blackmon MD   Hosp Day # 1 PCP None Pcp     Subjective:   Patient is a 44 year old male no pmh p/w traumatic sah .   Hospital course significant for found down with facial trauma thus brought to ED where w/u revealed ct head with small R frontal cortical sah and thrombocytopenia 76k thus transfused plt and pt was transferred to [facility] for further monitoring.     Agitation overnight requiring restraints this AM.    Vitals:   Temp:  [98.1 °F (36.7 °C)-100.5 °F (38.1 °C)] 99.3 °F (37.4 °C)  Pulse:  [] 110  Resp:  [17-31] 21  BP: ()/(59-96) 105/65  SpO2:  [94 %-99 %] 98 %  There is no height or weight on file to calculate BMI.    Intake/Output:    Intake/Output Summary (Last 24 hours) at 2024 0909  Last data filed at 2024 0637  Gross per 24 hour   Intake 2008 ml   Output 1500 ml   Net 508 ml     Current Meds:  Current Facility-Administered Medications   Medication Dose Route Frequency    sodium chloride 0.9% infusion   Intravenous Continuous    labetalol (Trandate) 5 mg/mL injection 10 mg  10 mg Intravenous Q10 Min PRN    hydrALAzine (Apresoline) 20 mg/mL injection 10 mg  10 mg Intravenous Q2H PRN    acetaminophen (Tylenol) tab 650 mg  650 mg Oral Q4H PRN    Or    acetaminophen (Tylenol) rectal suppository 650 mg  650 mg Rectal Q4H PRN    ondansetron (Zofran) 4 MG/2ML injection 4 mg  4 mg Intravenous Q6H PRN    Or    metoclopramide (Reglan) 5 mg/mL injection 10 mg  10 mg Intravenous Q8H PRN    thiamine (Vitamin B1) tab 100 mg  100 mg Oral Daily    multivitamin (Tab-A-Gela/Beta Carotene) tab 1 tablet  1 tablet Oral Daily    folic acid (Folvite) tab 1 mg  1 mg Oral Daily    mupirocin (Bactroban) 2 % ointment   Topical BID    LORazepam (Ativan) 2 mg/mL injection 4 mg  4 mg Intravenous Q5 Min PRN     amoxicillin clavulanate (Augmentin) 875-125 MG per tab 875 mg  875 mg Oral 2 times per day    LORazepam (Ativan) tab 1 mg  1 mg Oral Q1H PRN    Or    LORazepam (Ativan) 2 mg/mL injection 1 mg  1 mg Intravenous Q1H PRN    LORazepam (Ativan) tab 2 mg  2 mg Oral Q1H PRN    Or    LORazepam (Ativan) 2 mg/mL injection 2 mg  2 mg Intravenous Q1H PRN    LORazepam (Ativan) tab 3 mg  3 mg Oral Q1H PRN    Or    LORazepam (Ativan) 2 mg/mL injection 3 mg  3 mg Intravenous Q1H PRN    LORazepam (Ativan) 2 mg/mL injection 4 mg  4 mg Intravenous Q30 Min PRN    LORazepam (Ativan) 2 mg/mL injection 5 mg  5 mg Intravenous Q15 Min PRN    LORazepam (Ativan) 2 mg/mL injection 6 mg  6 mg Intravenous Q10 Min PRN     Physical Examination:   General- No acute distress  CV- RRR  Resp- CTA Bilat  Neuro-  Mental status- awake and alert, regards and follows commands, speech fluent  Cranial nerves- pupils equally round and reactive to light, extraocular muscles intact, face symmetric, visual fields full  Motor- 5/5 throughout  Sens-  Intact to light touch    Diagnostics:   CT BRAIN OR HEAD (CPT=70450)    Result Date: 11/18/2024  CONCLUSION:  Slight decrease in conspicuity of subarachnoid hemorrhage.  No new bleed identified.    LOCATION:  [facility]   Dictated by (CST): Gallito Ozuna MD on 11/18/2024 at 8:18 AM     Finalized by (CST): Gallito Ozuna MD on 11/18/2024 at 8:19 AM       CT FACIAL BONES (CPT=70486)    Result Date: 11/17/2024  CONCLUSION:   1. Depressed fractures of the anterior and left lateral nasal bones with minimal displacement.  Given moderate paranasal soft tissue swelling, these fractures are likely acute.  Rightward deviation of the nasal septum without evidence of septal fracture.  2. Linear lucency traverses the left zygomatic arch.  This has a somewhat corticated margin such that this may represent a chronic injury.  Correlate with localized tenderness at this site.  3. Thin linear lucency traversing the maxillary alveolar  ridge anteriorly, just left of midline.  This is suspicious for an acute nondisplaced fracture.  This traverses the root of the left central incisor.   LOCATION:  [id number]   Dictated by (CST): Rand Michael MD on 11/17/2024 at 12:16 PM     Finalized by (CST): Rand Michael MD on 11/17/2024 at 12:23 PM       CT BRAIN OR HEAD (CPT=70450)    Result Date: 11/17/2024  CONCLUSION:  1. Punctate focus of subarachnoid hemorrhage along the paramedian right frontal lobe. 2. Partially visualized nasal bone fracture, and opacification of the right maxillary sinus, please see separate report of CT facial bone.  COMMUNICATION:  The findings were communicated with Dr. Escalante at 12 10 on 11/17/2024. There was appropriate read back.   LOCATION:  [facility]   Dictated by (CST): Candelario Ngo MD on 11/17/2024 at 12:06 PM     Finalized by (CST): Candelario Ngo MD on 11/17/2024 at 12:11 PM      Lab Review     Recent Labs   Lab 11/17/24  1224 11/18/24  0443    130*   K 3.2* 3.6  3.6    98   CO2 23.0 22.0   * 94   BUN 8* 7*   CREATSERUM 0.61* 0.56*     Recent Labs   Lab 11/17/24  1224 11/18/24  0443   WBC 9.9 7.8   HGB 11.7* 10.6*   PLT 76.0* 83.0*     Assesment/Plan:     Neuro:  R frontal sah- traumatic 2/2 likely etoh-related fall.   Rpt ct and exam remains stable.   Cont neurochecks/pt/ot/st.  Etoh w/d- ciwa per IM  No further neurocritical care needs at this time, further management and outpt follow-up per Neurosurg, will follow peripherally please call with any questions.   Cardiac:  sbp goal 100-150  Pulmonary:  Stable on RA  Renal:  IVFs  GI:  PO as tolerated  Heme/ID:  Thrombocytopenia- stable, w/u and management per IM.   Endocrine/Rheum:  Monitor glucose, sliding scale insulin prn  DVT Prophylaxis:  SCD’s    Goals of the Day: neurochecks, ciwa  A total of 40 minutes of critical care time (exclusive of billable procedures) was administered to manage and/or prevent neurologic instability. This involved direct patient  intervention, complex decision making, and/or extensive discussions with the patient, family, and clinical staff.    Thank you for allowing me to participate in the care of this patient.     Sheila Germain MD, [facility]  Medical Director of System Neurosciences  Chief, Section of Neurocritical Care  [facility]

## 2024-11-19 LAB
GLUCOSE BLD-MCNC: 101 MG/DL (ref 70–99)
GLUCOSE BLD-MCNC: 108 MG/DL (ref 70–99)
POTASSIUM SERPL-SCNC: 3.9 MMOL/L (ref 3.5–5.1)

## 2024-11-19 PROCEDURE — 99232 SBSQ HOSP IP/OBS MODERATE 35: CPT | Performed by: INTERNAL MEDICINE

## 2024-11-19 NOTE — PLAN OF CARE
Assumed pt care at 0730. Pt drowsy/ lethargic this am but opens eyes to speech. Oriented x3. Pupils equal and reactive. Following commands. Equal strength. CIWA 0-2. Restraints removed. Nose dressing changed. See flowsheet. Precedex gtt weaned off at 0930. VSS. NSR. Denies pain. Adat. PT/OT- up to chair and ambulating in hallway with walker. Transfer orders. Pt updated with poc.

## 2024-11-19 NOTE — OCCUPATIONAL THERAPY NOTE
OCCUPATIONAL THERAPY NEURO EVALUATION - INPATIENT     Room Number: 6613/6613-A  Evaluation Date: 11/19/2024  Type of Evaluation: Initial  Presenting Problem: Tramatic Subarachnoid Hemorrhage    Physician Order: IP Consult to Occupational Therapy  Reason for Therapy: ADL/IADL Dysfunction and Discharge Planning    OCCUPATIONAL THERAPY ASSESSMENT   Patient is currently functioning below baseline with toileting, lower body dressing, grooming, bed mobility, transfers, static standing balance, dynamic standing balance, functional standing tolerance, and energy conservation strategies. Prior to admission, patient's baseline is IND w/ no mobility aids.  Patient is requiring CGA for functional transfers, transferring from supine to sit, simulated toileting, standing balance, & SUP for sitting balance & rolling.  as a result of the following impairments: decreased functional strength, decreased functional reach, decreased endurance, impaired standing balance, impaired coordination, impaired motor planning, decreased muscular endurance, cognitive deficits (memory, following commands, safety judgement, & awareness of deficits ), and decreased insight to deficits. Occupational Therapy will continue to follow for duration of hospitalization.    Patient will benefit from continued skilled OT Services for duration of hospitalization, however, given the patient is functioning near baseline level do not anticipate skilled therapy needs at discharge     History Related to Current Admission: Patient is a 44 year old male admitted on 11/17/2024 with Presenting Problem: Tramatic Subarachnoid Hemorrhage. Co-Morbidities : no pmhx    Recommendations for nursing staff:   Transfers: CGA w/ RW   Toileting location: bedside commode or toilet     EVALUATION SESSION:  Patient Start of Session: supine in bed     FUNCTIONAL TRANSFER ASSESSMENT  Sit to Stand: Edge of Bed; Chair  Edge of Bed: Contact Guard Assist  Chair: Contact Guard Assist    BED  MOBILITY  Rolling: Modified Independent  Supine to Sit : Contact Guard Assist    BALANCE ASSESSMENT  Static Sitting: Supervision  Static Standing: Contact Guard Assist    FUNCTIONAL ADL ASSESSMENT  Toileting Seated: Contact Guard Assist (Pt. simulated dinorah-care by demonstarting proper shoulder extension & internal rotation needed for task.)  Toileting Standing: Contact Guard Assist (Pt. simulated toilet transfer by completing sit to stand/stand to sit transfer from chair w/ RW.)    ACTIVITY TOLERANCE: Vitals stable. Pt. Did not verbalize dizziness. Pt. Tolerated bed mobility, functional transfers, simulated toileting skills, & ambulating down the baeza & back to room (100 ft.).                          O2 SATURATIONS       COGNITION  Attention Span:  attends with cues to redirect  Memory:  impaired working memory  Following Commands:  follows multi-step commands inconsistently  Motor Planning: impaired  Safety Judgement:  decreased awareness of need for assistance  Awareness of Deficits:  decreased awareness of deficits  Requires extra time & repetition for answering questions  & requires redirecting when asking questions. Pt. Was asked how many stairs & answered by talking about his job.     COGNITION ASSESSMENTS     VISION  Current Vision: no visual deficits    PERCEPTION  Overall Perception Status:   WFL - within functional limits    Communication: WNL    Behavioral/Emotional/Social: WNL    UPPER EXTREMITY  ROM: within functional limits   Strength: is within functional limits   Coordination  Gross motor: WNL  Fine motor: WNL  Sensation: Light touch:  intact      Neurological findings:                   EDUCATION PROVIDED  Family/Caregiver Education : Role of Occupational Therapy; Plan of Care  Family/Caregiver's Response to Education: Demonstrates Poor Carry Over to Information    Equipment used: RW  Demonstrates functional use, Would benefit from additional trial      Therapist comments: Pt. Required education on  proper hand placement when transferring from sitting EOB to standing w/ RW (pt. Attempted to lift RW while attempting to stand).     Patient End of Session: Up in chair;With  staff;Needs met;Call light within reach;RN aware of session/findings;All patient questions and concerns addressed;Hospital anti-slip socks;Alarm set    OCCUPATIONAL PROFILE    HOME SITUATION  Type of Home: Apartment  Home Layout: One level  Lives With: Alone    Toilet and Equipment: Standard height toilet                Drives: Yes  Patient Regularly Uses: None    Prior Level of Function: Pt. Stated that he was IND prior to admission w/ no mobility devices. Pt. Stated that he has a friend that can assist but [place]. 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     SUBJECTIVE   Pt. Was pleasant & cooperative.  Guinean Ipad  was used.     PAIN ASSESSMENT  Ratin  Location: did not state pain at this time       OBJECTIVE  Precautions: Bed/chair alarm; needed;Restraints (-150)  Fall Risk: High fall risk    ASSESSMENTS  AM-PAC ‘6-Clicks’ Inpatient Daily Activity Short Form  -   Putting on and taking off regular lower body clothing?: A Little  -   Bathing (including washing, rinsing, drying)?: A Little  -   Toileting, which includes using toilet, bedpan or urinal? : A Little  -   Putting on and taking off regular upper body clothing?: A Little  -   Taking care of personal grooming such as brushing teeth?: None  -   Eating meals?: None    AM-PAC Score:  Score: 20  Approx Degree of Impairment: 38.32%  Standardized Score (AM-PAC Scale): 42.03    ADDITIONAL TESTS     NEUROLOGICAL FINDINGS      PLAN  OT Device Recommendations: TBD  OT Treatment Plan: Balance activities;Energy conservation/work simplification techniques;ADL training;Functional transfer training;Endurance training;Patient/Family education;Compensatory technique education  Rehab Potential :  Fair  Frequency: 3-5x/week  Number of Visits to Meet Established Goals: 5    ADL Goals   Patient will perform grooming: with setup, with supervision, and while standing at sink  Patient will perform lower body dressing:  with stand by assist and while in chair  Patient will perform toileting: with supervision    Functional Transfer Goals  Patient will transfer from sit to stand:  with supervision  Patient will transfer to toilet:  with supervision    UE Exercise Program Goal  Patient will be supervision with bilateral AROM HEP (home exercise program).    Therapist Goals  Complete SBT    Patient Evaluation Complexity Level:   Occupational Profile/Medical History MODERATE - Expanded review of history including review of medical or therapy record   Specific performance deficits impacting engagement in ADL/IADL MODERATE  3 - 5 performance deficits   Client Assessment/Performance Deficits MODERATE - Comorbidities and min to mod modifications of tasks    Clinical Decision Making MODERATE - Analysis of occupational profile, detailed assessments, several treatment options    Overall Complexity MODERATE     OT Session Time: 30 minutes  Therapeutic Activity: 15 minutes

## 2024-11-19 NOTE — OCCUPATIONAL THERAPY NOTE
OT order received, chart reviewed, discussed case with RN who reports is he not appropriate to attempt out of bed mobility at this time.

## 2024-11-19 NOTE — PLAN OF CARE
Assumed care @ 1930. Patient sedated on precedex d/t agitation. Arouseable to speech and following command. Oriented to person, place and time. Primarily Tristanian speaking. On tele-NSR. On RA. SBP goal <150, maintained on own. Neuro checks Q4H-see flowsheet. Wrist/posey/ankle restraints. CIWA, no ativan needed. All needs met at this time.

## 2024-11-19 NOTE — PHYSICAL THERAPY NOTE
PHYSICAL THERAPY EVALUATION - INPATIENT     Room Number: 6613/6613-A  Evaluation Date: 11/19/2024  Type of Evaluation: Initial  Physician Order: PT Eval and Treat    Presenting Problem: SAH  Co-Morbidities : no pmhx  Reason for Therapy: Mobility Dysfunction and Discharge Planning    PHYSICAL THERAPY ASSESSMENT   Patient is a 44 year old male admitted 11/17/2024 for SAH.  Prior to admission, patient's baseline is indep.  Patient is currently functioning below baseline with bed mobility, transfers, gait, and stair negotiation.  Patient is requiring minimal assist and moderate assist as a result of the following impairments: impaired dynamic balance, decreased muscular endurance, and cognitive deficits ( ).  Physical Therapy will continue to follow for duration of hospitalization.    Patient will benefit from continued skilled PT Services for duration of hospitalization, however, given the patient is functioning near baseline level do not anticipate skilled therapy needs at discharge . Anticipate improvement in functional mobility thoughout hospital stay.     PLAN DURING HOSPITALIZATION  Nursing Mobility Recommendation :  (1-2 assist)  PT Device Recommendation: Rolling walker  PT Treatment Plan: Bed mobility;Body mechanics;Coordination;Endurance;Energy conservation;Patient education;Family education;Gait training;Neuromuscular re-educate;Range of motion;Strengthening;Stoop training;Stair training;Transfer training;Balance training  Rehab Potential : Good  Frequency (Obs): 3-5x/week     CURRENT GOALS    Goal #1 Patient is able to demonstrate supine - sit EOB @ level: supervision     Goal #2 Patient is able to demonstrate transfers EOB to/from Chair/Wheelchair at assistance level: supervision     Goal #3 Patient is able to ambulate 150 feet with assist device:  LRAD  at assistance level: supervision     Goal #4 Patient will navigate stairs with rail and SBA   Goal #5    Goal #6    Goal Comments: Goals established on  2024      PHYSICAL THERAPY MEDICAL/SOCIAL HISTORY  History related to current admission: Patient is a 44 year old male admitted on 2024: Presented after being found face down near his apt dx traumatic R frontal SAH, ETOH intoxication, CT with facial bone fx. Restrained -.     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, denies any additional hx of falls, states he has a friend that could help him at dc but he's currently in [place]    SUBJECTIVE  Italian Ipad  utilized     OBJECTIVE  Precautions: Bed/chair alarm; needed;Restraints (-150)  Fall Risk: High fall risk    WEIGHT BEARING RESTRICTION     PAIN ASSESSMENT  Ratin          COGNITION  Requiring increased time and intermittent repetition to answer verbal questions and participate in functional mobility  Intermittently answering questions mildly incorrectly ie therapist asked if he had stairs at home but we had previously been talking about his job and he responded about stairs at his job, repeated education provided on call don't fall x3 with pt able to accurately point and use call button correctly by end of session     RANGE OF MOTION AND STRENGTH ASSESSMENT  Upper extremity ROM and strength are within functional limits     Lower extremity ROM is within functional limits     Lower extremity strength is within functional limits     BALANCE  Static Sitting: Fair -  Dynamic Sitting: Fair -  Static Standing: Poor +  Dynamic Standing: Poor +    ADDITIONAL TESTS  Additional Tests: Modified [place]              Modified [place]: 4                  ACTIVITY TOLERANCE  Pulse:  (HR up to 130 after ambulation)        BP: 97/76  BP Location: Right arm  BP Method: Automatic  Patient  Position: Sitting    O2 WALK       NEUROLOGICAL FINDINGS                        AM-PAC '6-Clicks' INPATIENT SHORT FORM - BASIC MOBILITY  How much difficulty does the patient currently have...  Patient Difficulty: Turning over in bed (including adjusting bedclothes, sheets and blankets)?: A Little   Patient Difficulty: Sitting down on and standing up from a chair with arms (e.g., wheelchair, bedside commode, etc.): A Little   Patient Difficulty: Moving from lying on back to sitting on the side of the bed?: A Little   How much help from another person does the patient currently need...   Help from Another: Moving to and from a bed to a chair (including a wheelchair)?: A Little   Help from Another: Need to walk in hospital room?: A Little   Help from Another: Climbing 3-5 steps with a railing?: A Little     AM-PAC Score:  Raw Score: 18   Approx Degree of Impairment: 46.58%   Standardized Score (AM-PAC Scale): 43.63   CMS Modifier (G-Code): CK    FUNCTIONAL ABILITY STATUS  Gait Assessment   Functional Mobility/Gait Assessment  Gait Assistance: Minimum assistance;Moderate assistance  Distance (ft): 100  Assistive Device: Rolling walker  Pattern: Shuffle    Skilled Therapy Provided     Bed Mobility:  Rolling: mod I   Supine to sit: CGA   Sit to supine: NT     Transfer Mobility:  Sit to stand: CGA with inc time, trialed with and w/o RW as initially he was picking RW off the ground and attempting to hold it up while performing STS transfer, next attempt with therapist placing BL  hands on RW handle with improved mechanics    Stand to sit: CGA  Gait = min A with intermittent mod A w/ chair follow, assist provided with dynamic balance stability and RW navigation     Therapist's Comments: Discussed case with RN prior to session initiation. Pt agreeable to participation in therapy. Gait belt donned for out of bed mobility. Denied dizziness or pain throughout session.       Exercise/Education Provided:  Body  mechanics  Functional activity tolerated  Gait training  Transfer training    Patient End of Session: Up in chair;Needs met;Call light within reach;RN aware of session/findings;All patient questions and concerns addressed;Hospital anti-slip socks;Alarm set;Discussed recommendations with / (fall mat)      Patient Evaluation Complexity Level:  History Moderate - 1 or 2 personal factors and/or co-morbidities   Examination of body systems Moderate - addressing a total of 3 or more elements   Clinical Presentation  Moderate - Evolving   Clinical Decision Making Moderate Complexity       PT Session Time: 30 minutes  Gait Training: 15 minutes  Therapeutic Activity:  minutes  Neuromuscular Re-education:  minutes  Therapeutic Exercise:  minutes

## 2024-11-20 LAB — GLUCOSE BLD-MCNC: 120 MG/DL (ref 70–99)

## 2024-11-20 PROCEDURE — 99232 SBSQ HOSP IP/OBS MODERATE 35: CPT | Performed by: HOSPITALIST

## 2024-11-20 NOTE — PROGRESS NOTES
[facility]   part of [facility]     Hospitalist Progress Note     Amado Medina Patient Status:  Inpatient    3/22/1980 MRN [id number]   Location [facility] 6NE-A Attending Abi Blackmon MD   Hosp Day # 3 PCP None Pcp     Chief Complaint:I am fine      Subjective:     Pt seen off of precedex gtt. Still feels uneasy    Objective:    Review of Systems:   A comprehensive review of systems was completed; pertinent positive and negatives stated in subjective.    Vital signs:  Temp:  [98 °F (36.7 °C)-99.2 °F (37.3 °C)] 99.2 °F (37.3 °C)  Pulse:  [] 100  Resp:  [16-27] 22  BP: (101-153)/() 127/85  SpO2:  [91 %-100 %] 99 %    Physical Exam:    General: No acute distress  Respiratory: No wheezes, no rhonchi  Cardiovascular: S1, S2, regular rate and rhythm  Abdomen: Soft, Non-tender, non-distended, positive bowel sounds  Neuro: No new focal deficits.   Extremities: No edema      Diagnostic Data:    Labs:  Recent Labs   Lab 24  1224 24  0443   WBC 9.9 7.8   HGB 11.7* 10.6*   MCV 86.5 88.3   PLT 76.0* 83.0*   INR 0.99  --        Recent Labs   Lab 24  1224 24  0443 24  1103 24  0449   * 94 120*  --    BUN 8* 7* 8*  --    CREATSERUM 0.61* 0.56* 0.69*  --    CA 8.6* 8.2* 8.3*  --    ALB 3.8 3.9  --   --     130* 131*  --    K 3.2* 3.6  3.6 3.4* 3.9    98 98  --    CO2 23.0 22.0 25.0  --    ALKPHO 130* 133*  --   --    * 118*  --   --    ALT 58* 49  --   --    BILT 0.6 1.2  --   --    TP 7.7 7.0  --   --        CrCl cannot be calculated (Unknown ideal weight.).    No results for input(s): \"TROP\", \"TROPHS\", \"CK\" in the last 168 hours.    Recent Labs   Lab 24  1224   PTP 13.2   INR 0.99                  Microbiology    No results found for this visit on 24.      Imaging: Reviewed in Epic.    Medications:    water-oil emulsion   Topical BID    thiamine  100 mg Oral Daily    multivitamin  1 tablet Oral Daily    folic acid  1  mg Oral Daily    mupirocin   Topical BID    amoxicillin potassium and clavulanate  875 mg Oral 2 times per day       Assessment & Plan:      #Traumatic SAH  -neuro sx eval noted from 11/18, signed off w/ repeat scans/follow up in 2 weeeks     # s/p fall   # ETOH intoxication   - cont CIWA   - IVF     # Thrombocytopenia , due to BM suppression from ETOH most likely  - monitor, repeat lab sin AM    #Hypokalemia , replace   # hyponatremia   - monitor, repeat labs in AM    # elevated liver enzymes due to ETOH hepatitis   # nasal bone fracture,  - wound care  - pain control   - ENT rec noted       Jes Callejas, DO    Supplementary Documentation:     Quality:  DVT Mechanical Prophylaxis:   SCDs,    DVT Pharmacologic Prophylaxis   Medication   None                Code Status: Full Code  Alvarado: No urinary catheter in place  Alvarado Duration (in days):   Central line:    DILLON:     Discharge is dependent on: course  At this point Mr. Medina is expected to be discharge to: home    The 21st Century Cures Act makes medical notes like these available to patients in the interest of transparency. Please be advised this is a medical document. Medical documents are intended to carry relevant information, facts as evident, and the clinical opinion of the practitioner. The medical note is intended as peer to peer communication and may appear blunt or direct. It is written in medical language and may contain abbreviations or verbiage that are unfamiliar.

## 2024-11-20 NOTE — CM/SW NOTE
Patient worked with therapies earlier, near baseline--anticipate NO discharge needs @ discharge--CM/SW to continue to follow

## 2024-11-20 NOTE — PLAN OF CARE
Assumed pt care this morning at 0730.  Pt is A&Ox4, following commands. Croatian speaker.   Pt on room air, VSS.  ST  Pt denies pain.  Pt up x 1 w/ walker and gatebelt. Unsteady.   Pt on regular diet. Tolerating diet.   R forearm SL  CIWAS. Neuro Q4.  Wound care for face as listed in nurse to nurse communication. Dressing change done today, site and dressing c/d/I.  SBP parameters below 150.   Bed in lowest position, call light in reach.         Problem: METABOLIC/FLUID AND ELECTROLYTES - ADULT  Goal: Electrolytes maintained within normal limits  Description: INTERVENTIONS:  - Monitor labs and rhythm and assess patient for signs and symptoms of electrolyte imbalances  - Administer electrolyte replacement as ordered  - Monitor response to electrolyte replacements, including rhythm and repeat lab results as appropriate  - Fluid restriction as ordered  - Instruct patient on fluid and nutrition restrictions as appropriate  Outcome: Progressing     Problem: HEMATOLOGIC - ADULT  Goal: Free from bleeding injury  Description: (Example usage: patient with low platelets)  INTERVENTIONS:  - Avoid intramuscular injections, enemas and rectal medication administration  - Ensure safe mobilization of patient  - Hold pressure on venipuncture sites to achieve adequate hemostasis  - Assess for signs and symptoms of internal bleeding  - Monitor lab trends  - Patient is to report abnormal signs of bleeding to staff  - Avoid use of toothpicks and dental floss  - Use electric shaver for shaving  - Use soft bristle tooth brush  - Limit straining and forceful nose blowing  Outcome: Progressing

## 2024-11-20 NOTE — PHYSICAL THERAPY NOTE
PHYSICAL THERAPY TREATMENT NOTE - INPATIENT    Room Number: 3618/3618-A     Session: 1     Number of Visits to Meet Established Goals: 10    Presenting Problem: SAH  Co-Morbidities : no pmhx    PHYSICAL THERAPY ASSESSMENT   Patient demonstrates good  progress this session, goals  remain in progress.      Patient is requiring contact guard assist as a result of the following impairments: impaired dynamic balance and impaired motor planning.     Patient continues to function below baseline with gait.  Next session anticipate patient to progress gait and stair negotiation.  Physical Therapy will continue to follow patient for duration of hospitalization.    Patient continues to benefit from continued skilled PT services: for duration of hospitalization, however, given the patient is functioning near baseline level do not anticipate skilled therapy needs at discharge .    PLAN DURING HOSPITALIZATION  Nursing Mobility Recommendation : 1 Assist  PT Device Recommendation: Rolling walker  PT Treatment Plan: Bed mobility;Body mechanics;Coordination;Endurance;Energy conservation;Patient education;Family education;Gait training;Neuromuscular re-educate;Range of motion;Strengthening;Stoop training;Stair training;Transfer training;Balance training  Frequency (Obs): 3-5x/week     CURRENT GOALS     Goal #1 Patient is able to demonstrate supine - sit EOB @ level: supervision   MET 11/20   Goal #2 Patient is able to demonstrate transfers EOB to/from Chair/Wheelchair at assistance level: supervision      Goal #3 Patient is able to ambulate 150 feet with assist device:  LRAD  at assistance level: supervision      Goal #4 Patient will navigate stairs with rail and SBA   Goal #5     Goal #6     Goal Comments: Goals established on 11/19/2024 11/20/2024 all goals ongoing     SUBJECTIVE  Faroese Ipad  utilized     OBJECTIVE  Precautions: Bed/chair alarm; needed;Restraints (-150)    WEIGHT BEARING  RESTRICTION     PAIN ASSESSMENT   Ratin          BALANCE                                                                                                                       Static Sitting: Good  Dynamic Sitting: Good           Static Standing: Fair -  Dynamic Standing: Fair -    ACTIVITY TOLERANCE                         O2 WALK       AM-PAC '6-Clicks' INPATIENT SHORT FORM - BASIC MOBILITY  How much difficulty does the patient currently have...  Patient Difficulty: Turning over in bed (including adjusting bedclothes, sheets and blankets)?: None   Patient Difficulty: Sitting down on and standing up from a chair with arms (e.g., wheelchair, bedside commode, etc.): A Little   Patient Difficulty: Moving from lying on back to sitting on the side of the bed?: None   How much help from another person does the patient currently need...   Help from Another: Moving to and from a bed to a chair (including a wheelchair)?: A Little   Help from Another: Need to walk in hospital room?: A Little   Help from Another: Climbing 3-5 steps with a railing?: A Little     AM-PAC Score:  Raw Score: 20   Approx Degree of Impairment: 35.83%   Standardized Score (AM-PAC Scale): 47.67   CMS Modifier (G-Code): CJ    FUNCTIONAL ABILITY STATUS  Gait Assessment   Functional Mobility/Gait Assessment  Gait Assistance: Contact guard assist  Distance (ft): 300  Assistive Device: Rolling walker  Pattern: Shuffle (impaired dynamic balance)    Skilled Therapy Provided    Bed Mobility:  Rolling: indep   Supine<>Sit: indep   Sit<>Supine: indep     Transfer Mobility:  Sit<>Stand: CGA   Stand<>Sit: CGA   Gait: CGA    Therapist's Comments: Discussed case with RN prior to session initiation. Pt agreeable to participation in therapy. Gait belt donned for out of bed mobility. Pt denied dizziness/pain throughout session. Able to progress gait training today to further distance. Fast gait speed - intermittently taking one hand of RW resulting in LOB x2  requiring external assist from therapist to correct. Educated on slower gait speed and maintaining BUE on RW, will plan to progress off RW as appropriate and assess stairs next session if appropriate. Pt agreeable.       Patient End of Session: In bed;Needs met;Call light within reach;RN aware of session/findings;All patient questions and concerns addressed;Hospital anti-slip socks;Alarm set    PT Session Time: 23 minutes  Gait Trainin minutes  Therapeutic Activity:  minutes  Therapeutic Exercise:  minutes   Neuromuscular Re-education:  minutes

## 2024-11-20 NOTE — PLAN OF CARE
Assumed care @ 1930. Patient primarily Greenlandic speaking. A&O x4. Follows command. On tele-NSR, ST. On RA. Normotensive. Fine hand tremors. Denies pain. Dressing to  nose-CDI. Neuro checks [id number]. Up to void-walker and 2 assist. Call light within reach. All needs met at this time.

## 2024-11-20 NOTE — CM/SW NOTE
Chart reviewed and noted [place] [employer] has assessed pt. Per GLM, patient exceeds Medicaid income limit.     Self-pay resources placed on AVS. SW will continue to remain available.     JOHANNA Ocampo  Discharge Planner

## 2024-11-21 LAB
ALBUMIN SERPL-MCNC: 4 G/DL (ref 3.2–4.8)
ALP LIVER SERPL-CCNC: 125 U/L
ALT SERPL-CCNC: 81 U/L
ANION GAP SERPL CALC-SCNC: 7 MMOL/L (ref 0–18)
AST SERPL-CCNC: 407 U/L (ref ?–34)
BILIRUB DIRECT SERPL-MCNC: 0.2 MG/DL (ref ?–0.3)
BILIRUB SERPL-MCNC: 0.5 MG/DL (ref 0.3–1.2)
BUN BLD-MCNC: 6 MG/DL (ref 9–23)
CALCIUM BLD-MCNC: 9.1 MG/DL (ref 8.7–10.4)
CHLORIDE SERPL-SCNC: 105 MMOL/L (ref 98–112)
CO2 SERPL-SCNC: 23 MMOL/L (ref 21–32)
CREAT BLD-MCNC: 0.65 MG/DL
EGFRCR SERPLBLD CKD-EPI 2021: 120 ML/MIN/1.73M2 (ref 60–?)
ERYTHROCYTE [DISTWIDTH] IN BLOOD BY AUTOMATED COUNT: 18.6 %
GLUCOSE BLD-MCNC: 117 MG/DL (ref 70–99)
HCT VFR BLD AUTO: 33.9 %
HGB BLD-MCNC: 11.3 G/DL
MCH RBC QN AUTO: 29.9 PG (ref 26–34)
MCHC RBC AUTO-ENTMCNC: 33.3 G/DL (ref 31–37)
MCV RBC AUTO: 89.7 FL
OSMOLALITY SERPL CALC.SUM OF ELEC: 279 MOSM/KG (ref 275–295)
PLATELET # BLD AUTO: 197 10(3)UL (ref 150–450)
POTASSIUM SERPL-SCNC: 3.7 MMOL/L (ref 3.5–5.1)
PROT SERPL-MCNC: 8 G/DL (ref 5.7–8.2)
RBC # BLD AUTO: 3.78 X10(6)UL
SODIUM SERPL-SCNC: 135 MMOL/L (ref 136–145)
WBC # BLD AUTO: 9.5 X10(3) UL (ref 4–11)

## 2024-11-21 PROCEDURE — 99233 SBSQ HOSP IP/OBS HIGH 50: CPT | Performed by: HOSPITALIST

## 2024-11-21 PROCEDURE — 99291 CRITICAL CARE FIRST HOUR: CPT | Performed by: INTERNAL MEDICINE

## 2024-11-21 RX ORDER — LORAZEPAM 2 MG/ML
5 INJECTION INTRAMUSCULAR
Status: DISCONTINUED | OUTPATIENT
Start: 2024-11-21 | End: 2024-11-24 | Stop reason: ALTCHOICE

## 2024-11-21 RX ORDER — PHENOBARBITAL SODIUM 130 MG/ML
97.5 INJECTION, SOLUTION INTRAMUSCULAR; INTRAVENOUS EVERY 8 HOURS
Status: DISCONTINUED | OUTPATIENT
Start: 2024-11-21 | End: 2024-11-23

## 2024-11-21 RX ORDER — LORAZEPAM 1 MG/1
2 TABLET ORAL
Status: DISCONTINUED | OUTPATIENT
Start: 2024-11-21 | End: 2024-11-26

## 2024-11-21 RX ORDER — PHENOBARBITAL 32.4 MG/1
32.4 TABLET ORAL EVERY 8 HOURS SCHEDULED
Status: DISCONTINUED | OUTPATIENT
Start: 2024-11-23 | End: 2024-11-21

## 2024-11-21 RX ORDER — LORAZEPAM 1 MG/1
1 TABLET ORAL
Status: DISCONTINUED | OUTPATIENT
Start: 2024-11-21 | End: 2024-11-26

## 2024-11-21 RX ORDER — METOPROLOL TARTRATE 25 MG/1
25 TABLET, FILM COATED ORAL 2 TIMES DAILY PRN
Status: DISCONTINUED | OUTPATIENT
Start: 2024-11-21 | End: 2024-11-29

## 2024-11-21 RX ORDER — PHENOBARBITAL 32.4 MG/1
64.8 TABLET ORAL EVERY 8 HOURS
Status: DISCONTINUED | OUTPATIENT
Start: 2024-11-21 | End: 2024-11-21

## 2024-11-21 RX ORDER — LORAZEPAM 2 MG/1
2 TABLET ORAL
Status: DISCONTINUED | OUTPATIENT
Start: 2024-11-21 | End: 2024-11-24

## 2024-11-21 RX ORDER — LORAZEPAM 1 MG/1
3 TABLET ORAL
Status: DISCONTINUED | OUTPATIENT
Start: 2024-11-21 | End: 2024-11-26

## 2024-11-21 RX ORDER — SODIUM CHLORIDE, SODIUM LACTATE, POTASSIUM CHLORIDE, CALCIUM CHLORIDE 600; 310; 30; 20 MG/100ML; MG/100ML; MG/100ML; MG/100ML
INJECTION, SOLUTION INTRAVENOUS CONTINUOUS
Status: DISCONTINUED | OUTPATIENT
Start: 2024-11-21 | End: 2024-11-23

## 2024-11-21 RX ORDER — LORAZEPAM 2 MG/ML
1 INJECTION INTRAMUSCULAR
Status: DISCONTINUED | OUTPATIENT
Start: 2024-11-21 | End: 2024-11-24 | Stop reason: ALTCHOICE

## 2024-11-21 RX ORDER — HALOPERIDOL 5 MG/ML
2 INJECTION INTRAMUSCULAR EVERY 8 HOURS PRN
Status: DISCONTINUED | OUTPATIENT
Start: 2024-11-21 | End: 2024-11-29

## 2024-11-21 RX ORDER — LORAZEPAM 1 MG/1
1 TABLET ORAL
Status: DISCONTINUED | OUTPATIENT
Start: 2024-11-21 | End: 2024-11-24

## 2024-11-21 RX ORDER — LORAZEPAM 2 MG/ML
2 INJECTION INTRAMUSCULAR
Status: DISCONTINUED | OUTPATIENT
Start: 2024-11-21 | End: 2024-11-24 | Stop reason: ALTCHOICE

## 2024-11-21 RX ORDER — DOXEPIN HYDROCHLORIDE 50 MG/1
1 CAPSULE ORAL DAILY
Status: DISCONTINUED | OUTPATIENT
Start: 2024-11-21 | End: 2024-11-21

## 2024-11-21 RX ORDER — LORAZEPAM 2 MG/ML
4 INJECTION INTRAMUSCULAR ONCE
Status: COMPLETED | OUTPATIENT
Start: 2024-11-21 | End: 2024-11-21

## 2024-11-21 RX ORDER — PHENOBARBITAL SODIUM 130 MG/ML
65 INJECTION, SOLUTION INTRAMUSCULAR; INTRAVENOUS EVERY 8 HOURS
Status: DISCONTINUED | OUTPATIENT
Start: 2024-11-23 | End: 2024-11-24

## 2024-11-21 RX ORDER — LORAZEPAM 2 MG/ML
2 INJECTION INTRAMUSCULAR ONCE
Status: COMPLETED | OUTPATIENT
Start: 2024-11-21 | End: 2024-11-21

## 2024-11-21 RX ORDER — PHENOBARBITAL SODIUM 130 MG/ML
32.5 INJECTION, SOLUTION INTRAMUSCULAR; INTRAVENOUS EVERY 8 HOURS
Status: DISCONTINUED | OUTPATIENT
Start: 2024-11-25 | End: 2024-11-24

## 2024-11-21 RX ORDER — LORAZEPAM 2 MG/ML
3 INJECTION INTRAMUSCULAR
Status: DISCONTINUED | OUTPATIENT
Start: 2024-11-21 | End: 2024-11-24 | Stop reason: ALTCHOICE

## 2024-11-21 RX ORDER — PHENOBARBITAL SODIUM 130 MG/ML
260 INJECTION, SOLUTION INTRAMUSCULAR; INTRAVENOUS ONCE
Status: COMPLETED | OUTPATIENT
Start: 2024-11-21 | End: 2024-11-21

## 2024-11-21 RX ORDER — THIAMINE HYDROCHLORIDE 100 MG/ML
100 INJECTION, SOLUTION INTRAMUSCULAR; INTRAVENOUS ONCE
Status: DISCONTINUED | OUTPATIENT
Start: 2024-11-21 | End: 2024-11-21

## 2024-11-21 RX ORDER — DEXMEDETOMIDINE HYDROCHLORIDE 4 UG/ML
INJECTION, SOLUTION INTRAVENOUS CONTINUOUS
Status: DISCONTINUED | OUTPATIENT
Start: 2024-11-21 | End: 2024-11-24

## 2024-11-21 RX ORDER — LORAZEPAM 2 MG/ML
6 INJECTION INTRAMUSCULAR EVERY 10 MIN PRN
Status: DISCONTINUED | OUTPATIENT
Start: 2024-11-21 | End: 2024-11-24 | Stop reason: ALTCHOICE

## 2024-11-21 RX ORDER — LORAZEPAM 2 MG/ML
4 INJECTION INTRAMUSCULAR EVERY 30 MIN PRN
Status: DISCONTINUED | OUTPATIENT
Start: 2024-11-21 | End: 2024-11-24 | Stop reason: ALTCHOICE

## 2024-11-21 RX ORDER — FOLIC ACID 1 MG/1
1 TABLET ORAL DAILY
Status: DISCONTINUED | OUTPATIENT
Start: 2024-11-21 | End: 2024-11-21

## 2024-11-21 NOTE — SLP NOTE
[facility]    Brief Operative Note    Pre-operative diagnosis: Fracture of femoral neck, left (H) [S72.002A]  Post-operative diagnosis Same as pre-operative diagnosis    Procedure: Procedure(s):  LEFT HIP HEMIARTHROPLASTY  Surgeon: Surgeon(s) and Role:     * Noah Bautista MD - Primary     * Farida Mckinney PA-C - Assisting  Anesthesia: Spinal   Estimated Blood Loss: 25 ml    Drains: None  Specimens: * No specimens in log *  Findings:   fracture.  Complications: None.  Implants:   Implant Name Type Inv. Item Serial No.  Lot No. LRB No. Used Action   RESTRICTOR BONE CEMENT 18.5MM SM UNIV W/ - [id number] Cement, Bone RESTRICTOR BONE CEMENT 18.5MM SM UNIV W/  [employer] [id number] Left 1 Implanted   BONE CEMENT SIMPLEX FULL DOSE 6191-1-001 - [id number] Cement, Bone BONE CEMENT SIMPLEX FULL DOSE 6191-1-001  [employer] [id number] Left 2 Implanted   IMP HEAD FEMORAL DEPUY 28MM +1.5MM 1365- - [id number] Total Joint Component/Insert IMP HEAD FEMORAL DEPUY 28MM +1.5MM 1365-  [employer]- [id number] Left 1 Implanted   IMP STEM CENTRALIZER DEPUY 9.25MM 1376- - [id number] Total Joint Component/Insert IMP STEM CENTRALIZER DEPUY 9.25MM 1376-  [employer]- [id number] Left 1 Implanted   IMP HEAD FEMORAL DEPUY BIPOLAR [id number] - [id number] Total Joint Component/Insert IMP HEAD FEMORAL DEPUY BIPOLAR [id number]  [employer]- [id number] Left 1 Implanted   IMP STEM FEMORAL HIP DEPUY [place] SZ 4 1570- - [id number] Total Joint Component/Insert IMP STEM FEMORAL HIP DEPUY SUMMIT SZ 4 1570-  [employer]- [id number] Left 1 Implanted            Attempted to see patient as per plan of care however since last SLP visit, patient has transferred to ICU and currently asleep s/p Ativan.  SLP will defer and re-attempt follow-up at another time, when patient able to participate.   Lubna Hancock MS CCC-SLP/L, pager [phone]  Speech-LanguagePathologist

## 2024-11-21 NOTE — SPIRITUAL CARE NOTE
Spiritual Care Visit Note    Patient Name: Amado Medina Date of Spiritual Care Visit: 24   : 3/22/1980 Primary Dx: Traumatic subarachnoid hemorrhage with unknown loss of consciousness status, initial encounter ([facility])       Referred By: Referral From: Other (Comment) (LOS list)    Spiritual Care Taxonomy:    Intended Effects: Build relationship of care and support    Methods: Offer emotional support;Exploring hope;Explore tri and values    Interventions: Explain  role;Provide compassionate touch;Prayer for healing;Discuss coping mechanism with someone;Discuss concerns;Share words of hope and inspiration;Share written prayer;Provde spiritual/Denominational resources    Visit Type/Summary:     - Spiritual Care: Visit was conducted in Latvian. Amado was very cordial and seemed curious, but somewhat confused. He admitted he needed to make healthier choices and seemed somewhat interested in Alcoholics Anonymous.  shared his experience and recovery. As Amado identified as Christianity,  offered Christianity prayer resources in Latvian, both orally and written.  remains available as needed for follow up.    Spiritual Care support can be requested via an Epic consult. For urgent/immediate needs, please contact the On Call  at: Edward: ext [phone]

## 2024-11-21 NOTE — PROGRESS NOTES
[facility]   part of Located within [facility]     Hospitalist Progress Note     Amado Medina Patient Status:  Inpatient    3/22/1980 MRN [id number]   Location [facility] 6NE-A Attending Abi Blackmon MD   Hosp Day # 4 PCP None Pcp     Chief Complaint:I am fine      Subjective:     Patient very restless, s/p 12 mg of IV Ativan, remains confused and is hallucinating.    Objective:    Review of Systems:   A comprehensive review of systems was completed; pertinent positive and negatives stated in subjective.    Vital signs:  Temp:  [97.5 °F (36.4 °C)-98.9 °F (37.2 °C)] 97.5 °F (36.4 °C)  Pulse:  [0-163] 163  Resp:  [20] 20  BP: (122-149)/() 145/101  SpO2:  [96 %-100 %] 100 %    Physical Exam:    General: No acute distress  Respiratory: No wheezes, no rhonchi  Cardiovascular: S1, S2, regular rate and rhythm  Abdomen: Soft, Non-tender, non-distended, positive bowel sounds  Neuro: No new focal deficits.   Extremities: No edema      Diagnostic Data:    Labs:  Recent Labs   Lab 24  1224 24  0443 24  0723   WBC 9.9 7.8 9.5   HGB 11.7* 10.6* 11.3*   MCV 86.5 88.3 89.7   PLT 76.0* 83.0* 197.0   INR 0.99  --   --        Recent Labs   Lab 24  1224 24  0443 24  1103 24  0449 24  0723   * 94 120*  --  117*   BUN 8* 7* 8*  --  6*   CREATSERUM 0.61* 0.56* 0.69*  --  0.65*   CA 8.6* 8.2* 8.3*  --  9.1   ALB 3.8 3.9  --   --   --     130* 131*  --  135*   K 3.2* 3.6  3.6 3.4* 3.9 3.7    98 98  --  105   CO2 23.0 22.0 25.0  --  23.0   ALKPHO 130* 133*  --   --   --    * 118*  --   --   --    ALT 58* 49  --   --   --    BILT 0.6 1.2  --   --   --    TP 7.7 7.0  --   --   --        CrCl cannot be calculated (Unknown ideal weight.).    No results for input(s): \"TROP\", \"TROPHS\", \"CK\" in the last 168 hours.    Recent Labs   Lab 24  1224   PTP 13.2   INR 0.99                  Microbiology    No results found for this visit on  11/17/24.      Imaging: Reviewed in Epic.    Medications:    PHENobarbital  64.8 mg Oral Q8H    Followed by    [START ON 11/23/2024] PHENobarbital  32.4 mg Oral Q8H ANGELIKA    water-oil emulsion   Topical BID    thiamine  100 mg Oral Daily    multivitamin  1 tablet Oral Daily    folic acid  1 mg Oral Daily    mupirocin   Topical BID    amoxicillin potassium and clavulanate  875 mg Oral 2 times per day       Assessment & Plan:      #s/p fall   #ETOH intoxication   #Suspect Dts  -transfer to icu, discussed w/ Dr. Cotton; consider precedex   -s/p iv ativan, start phenobarb  -CIWA  -IVF    #Traumatic SAH  -neuro sx eval noted from 11/18, signed off w/ repeat scans/follow up in 2 weeeks     #Thrombocytopenia , due to BM suppression from ETOH most likely  -monitor, repeat labs in AM    #Hypokalemia , replace   #Hyponatremia   -monitor, repeat labs in AM    #elevated liver enzymes due to ETOH hepatitis   #nasal bone fracture,  -wound care  -pain control   -ENT rec noted       Jes Callejas,     Supplementary Documentation:     Quality:  DVT Mechanical Prophylaxis:   SCDs,    DVT Pharmacologic Prophylaxis   Medication   None                Code Status: Full Code  Alvarado: No urinary catheter in place  Alvarado Duration (in days):   Central line:    DILLON:     Discharge is dependent on: course  At this point Mr. Medina is expected to be discharge to: home    The 21st Century Cures Act makes medical notes like these available to patients in the interest of transparency. Please be advised this is a medical document. Medical documents are intended to carry relevant information, facts as evident, and the clinical opinion of the practitioner. The medical note is intended as peer to peer communication and may appear blunt or direct. It is written in medical language and may contain abbreviations or verbiage that are unfamiliar.

## 2024-11-21 NOTE — PLAN OF CARE
Assumed care at 1930. Bangladeshi speaking. A&OX4, forgetful at times. RA. NSR/ST on tele. Regular diet. Non-cardiac electrolyte protocol. PIV L AC infusing NS at 75 mL/hr. Urinal at the beside. Generalized weakness. Tremors present. Wounds to face. CIWA protocol, PRN Ativan given per MAR. Neuro checks Q4H. Pt denies any pain at this time. Seizure/aspiration precautions in place. Call light within reach. Will continue with plan of care.      Problem: Patient/Family Goals  Goal: Patient/Family Long Term Goal  Description: Patient's Long Term Goal: Discharge with adequate resources     Interventions:  - Assistance from CM/SW if needed  - See additional Care Plan goals for specific interventions  Outcome: Progressing  Goal: Patient/Family Short Term Goal  Description: Patient's Short Term Goal: Remain comfortable    Interventions:   - PRN medications   -Non-pharmacological (Ice packs)  - See additional Care Plan goals for specific interventions  Outcome: Progressing     Problem: METABOLIC/FLUID AND ELECTROLYTES - ADULT  Goal: Electrolytes maintained within normal limits  Description: INTERVENTIONS:  - Monitor labs and rhythm and assess patient for signs and symptoms of electrolyte imbalances  - Administer electrolyte replacement as ordered  - Monitor response to electrolyte replacements, including rhythm and repeat lab results as appropriate  - Fluid restriction as ordered  - Instruct patient on fluid and nutrition restrictions as appropriate  Outcome: Progressing     Problem: HEMATOLOGIC - ADULT  Goal: Free from bleeding injury  Description: (Example usage: patient with low platelets)  INTERVENTIONS:  - Avoid intramuscular injections, enemas and rectal medication administration  - Ensure safe mobilization of patient  - Hold pressure on venipuncture sites to achieve adequate hemostasis  - Assess for signs and symptoms of internal bleeding  - Monitor lab trends  - Patient is to report abnormal signs of bleeding to staff  -  Avoid use of toothpicks and dental floss  - Use electric shaver for shaving  - Use soft bristle tooth brush  - Limit straining and forceful nose blowing  Outcome: Progressing

## 2024-11-21 NOTE — PHYSICAL THERAPY NOTE
Attempted to see pt for f/u therapy session, per RN pt not appropriate for therapy session this date. Will re-attempt once medically appropriate.

## 2024-11-21 NOTE — PLAN OF CARE
Assumed care around 0000  A&O x 3-4 forgetful of place, situation, time  RA  ST on tele - metoprolol x1 per ciwa protocol  Reg/gen diet  Urinal at bedside  +1 sb assist  Denies pain  Ciwa protocol, aspiration prec., seizure prec.  Q4 neuro  Prn ativan for ciwa scoring     0423 - 2 mg ativan. Ciwa scoring - 12.   MD notified as pt agitated, disoriented, IV removed, getting out of bed. ST 140s. X1 dose metoprolol administered.   0500 - new IV placed, ace wrapped    Safety precautions in place, bed in lowest position, call light within reach, all needs met at this time.      Problem: METABOLIC/FLUID AND ELECTROLYTES - ADULT  Goal: Electrolytes maintained within normal limits  Description: INTERVENTIONS:  - Monitor labs and rhythm and assess patient for signs and symptoms of electrolyte imbalances  - Administer electrolyte replacement as ordered  - Monitor response to electrolyte replacements, including rhythm and repeat lab results as appropriate  - Fluid restriction as ordered  - Instruct patient on fluid and nutrition restrictions as appropriate  Outcome: Progressing     Problem: HEMATOLOGIC - ADULT  Goal: Free from bleeding injury  Description: (Example usage: patient with low platelets)  INTERVENTIONS:  - Avoid intramuscular injections, enemas and rectal medication administration  - Ensure safe mobilization of patient  - Hold pressure on venipuncture sites to achieve adequate hemostasis  - Assess for signs and symptoms of internal bleeding  - Monitor lab trends  - Patient is to report abnormal signs of bleeding to staff  - Avoid use of toothpicks and dental floss  - Use electric shaver for shaving  - Use soft bristle tooth brush  - Limit straining and forceful nose blowing  Outcome: Progressing     Problem: SAFETY ADULT - FALL  Goal: Free from fall injury  Description: INTERVENTIONS:  - Assess pt frequently for physical needs  - Identify cognitive and physical deficits and behaviors that affect risk of falls.  -  [place] fall precautions as indicated by assessment.  - Educate pt/family on patient safety including physical limitations  - Instruct pt to call for assistance with activity based on assessment  - Modify environment to reduce risk of injury  - Provide assistive devices as appropriate  - Consider OT/PT consult to assist with strengthening/mobility  - Encourage toileting schedule  Outcome: Progressing     Problem: DISCHARGE PLANNING  Goal: Discharge to home or other facility with appropriate resources  Description: INTERVENTIONS:  - Identify barriers to discharge w/pt and caregiver  - Include patient/family/discharge partner in discharge planning  - Arrange for needed discharge resources and transportation as appropriate  - Identify discharge learning needs (meds, wound care, etc)  - Arrange for interpreters to assist at discharge as needed  - Consider post-discharge preferences of patient/family/discharge partner  - Complete POLST form as appropriate  - Assess patient's ability to be responsible for managing their own health  - Refer to Case Management Department for coordinating discharge planning if the patient needs post-hospital services based on physician/LIP order or complex needs related to functional status, cognitive ability or social support system  Outcome: Progressing     Problem: Altered Communication/Language Barrier  Goal: Patient/Family is able to understand and participate in their care  Description: Interventions:  - Assess communication ability and preferred communication style  - Implement communication aides and strategies  - Use visual cues when possible  - Listen attentively, be patient, do not interrupt  - Minimize distractions  - Allow time for understanding and response  - Establish method for patient to ask for assistance (call light)  - Provide an  as needed  - Communicate barriers and strategies to overcome with those who interact with patient  Outcome: Progressing

## 2024-11-21 NOTE — PLAN OF CARE
Assumed care at 0730  A/Ox2, RA, Sinhala speaking  Refusing tele  CIWA, seizure precaution   Reg diet  Posey, wrist & ankle restraints      1155: pt transferred to ICU given CIWA score    Problem: METABOLIC/FLUID AND ELECTROLYTES - ADULT  Goal: Electrolytes maintained within normal limits  Description: INTERVENTIONS:  - Monitor labs and rhythm and assess patient for signs and symptoms of electrolyte imbalances  - Administer electrolyte replacement as ordered  - Monitor response to electrolyte replacements, including rhythm and repeat lab results as appropriate  - Fluid restriction as ordered  - Instruct patient on fluid and nutrition restrictions as appropriate  Outcome: Progressing     Problem: HEMATOLOGIC - ADULT  Goal: Free from bleeding injury  Description: (Example usage: patient with low platelets)  INTERVENTIONS:  - Avoid intramuscular injections, enemas and rectal medication administration  - Ensure safe mobilization of patient  - Hold pressure on venipuncture sites to achieve adequate hemostasis  - Assess for signs and symptoms of internal bleeding  - Monitor lab trends  - Patient is to report abnormal signs of bleeding to staff  - Avoid use of toothpicks and dental floss  - Use electric shaver for shaving  - Use soft bristle tooth brush  - Limit straining and forceful nose blowing  Outcome: Progressing     Problem: SAFETY ADULT - FALL  Goal: Free from fall injury  Description: INTERVENTIONS:  - Assess pt frequently for physical needs  - Identify cognitive and physical deficits and behaviors that affect risk of falls.  - Victor fall precautions as indicated by assessment.  - Educate pt/family on patient safety including physical limitations  - Instruct pt to call for assistance with activity based on assessment  - Modify environment to reduce risk of injury  - Provide assistive devices as appropriate  - Consider OT/PT consult to assist with strengthening/mobility  - Encourage toileting schedule  Outcome:  Progressing     Problem: Altered Communication/Language Barrier  Goal: Patient/Family is able to understand and participate in their care  Description: Interventions:  - Assess communication ability and preferred communication style  - Implement communication aides and strategies  - Use visual cues when possible  - Listen attentively, be patient, do not interrupt  - Minimize distractions  - Allow time for understanding and response  - Establish method for patient to ask for assistance (call light)  - Provide an  as needed  - Communicate barriers and strategies to overcome with those who interact with patient  Outcome: Progressing

## 2024-11-21 NOTE — PLAN OF CARE
Received patient as transfer from floor earlier due to worsening withdrawals.   Patient alert, awake on arrival. Hallucinating. Confused. Attempting to get out of bed. Combative at times. Restraints in place. Trashing around in bed. Profusely sweating. Unable to obtain b/p at times due to just moving around too much. Seen by intensivist. Given ativan/phenobarbital/precedex. Was able to settle down and now sleeping. Etco2 monitor in place. Low b/p after all meds, bolus given and started on maintenance fluids. No family at bedside.

## 2024-11-21 NOTE — CONSULTS
[facility] Pulmonary Critical Care Consult Note    NAME: Amado Medina - ROOM: 453/453-A - MRN: [id number] - Age: 44 year old - :3/22/1980    History Of Present Illness:  Amado Medina is a 44 year old male who was originally admitted on  with subarachnoid hemorrhage, per patient he had been drinking tequila and beer that night and found facedown.  He had a traumatic right frontal subarachnoid hemorrhage for which she was originally in the  ICU, neuro surgery was consulted and neurocritical care following, they did not recommend any significant interventions.  While on the floor, patient started to go into alcohol withdrawal, patient is unable to give any further meaningful history.    Past Medical History:  History reviewed. No pertinent past medical history.  Past Surgical History:   History reviewed. No pertinent surgical history.   Family History:   No family history on file.  Social History:    reports that he has never smoked. He has quit using smokeless tobacco. He reports current alcohol use. He reports that he does not use drugs.     Review of Systems:   A comprehensive 10 point review of systems was completed.  Pertinent positives and negatives noted in the HPI.    Current Facility-Administered Medications   Medication Dose Route Frequency    metoprolol tartrate (Lopressor) tab 25 mg  25 mg Oral BID PRN    LORazepam (Ativan) tab 1 mg  1 mg Oral Q1H PRN    Or    LORazepam (Ativan) tab 2 mg  2 mg Oral Q1H PRN    PHENobarbital tab 64.8 mg  64.8 mg Oral Q8H    Followed by    [START ON 2024] PHENobarbital tab 32.4 mg  32.4 mg Oral Q8H ANGELIKA    LORazepam (Ativan) tab 1 mg  1 mg Oral Q1H PRN    Or    LORazepam (Ativan) 2 mg/mL injection 1 mg  1 mg Intravenous Q1H PRN    LORazepam (Ativan) tab 2 mg  2 mg Oral Q1H PRN    Or    LORazepam (Ativan) 2 mg/mL injection 2 mg  2 mg Intravenous Q1H PRN    LORazepam (Ativan) tab 3 mg  3 mg Oral Q1H PRN    Or    LORazepam (Ativan) 2 mg/mL  injection 3 mg  3 mg Intravenous Q1H PRN    LORazepam (Ativan) 2 mg/mL injection 4 mg  4 mg Intravenous Q30 Min PRN    LORazepam (Ativan) 2 mg/mL injection 5 mg  5 mg Intravenous Q15 Min PRN    LORazepam (Ativan) 2 mg/mL injection 6 mg  6 mg Intravenous Q10 Min PRN    haloperidol lactate (Haldol) 5 MG/ML injection 2 mg  2 mg Intravenous Q8H PRN    PHENobarbital (Luminal) 130 mg/mL injection 97.5 mg  97.5 mg Intravenous Q8H    Followed by    [START ON 11/23/2024] PHENobarbital (Luminal) 130 mg/mL injection 65 mg  65 mg Intravenous Q8H    Followed by    [START ON 11/25/2024] PHENobarbital (Luminal) 130 mg/mL injection 32.5 mg  32.5 mg Intravenous Q8H    dexmedeTOMIDine in sodium chloride 0.9% (Precedex) 400 mcg/100mL infusion premix  0.2-1.5 mcg/kg/hr (Dosing Weight) Intravenous Continuous    dexmedeTOMIDine in sodium chloride 0.9% (Precedex) 400 mcg/100mL infusion premix  0.2-1.5 mcg/kg/hr (Dosing Weight) Intravenous Continuous    water-oil emulsion (Eucirin) cream   Topical BID    sodium chloride 0.9% infusion   Intravenous Continuous    labetalol (Trandate) 5 mg/mL injection 10 mg  10 mg Intravenous Q10 Min PRN    hydrALAzine (Apresoline) 20 mg/mL injection 10 mg  10 mg Intravenous Q2H PRN    acetaminophen (Tylenol) tab 650 mg  650 mg Oral Q4H PRN    Or    acetaminophen (Tylenol) rectal suppository 650 mg  650 mg Rectal Q4H PRN    ondansetron (Zofran) 4 MG/2ML injection 4 mg  4 mg Intravenous Q6H PRN    Or    metoclopramide (Reglan) 5 mg/mL injection 10 mg  10 mg Intravenous Q8H PRN    thiamine (Vitamin B1) tab 100 mg  100 mg Oral Daily    multivitamin (Tab-A-Gela/Beta Carotene) tab 1 tablet  1 tablet Oral Daily    folic acid (Folvite) tab 1 mg  1 mg Oral Daily    mupirocin (Bactroban) 2 % ointment   Topical BID    amoxicillin clavulanate (Augmentin) 875-125 MG per tab 875 mg  875 mg Oral 2 times per day     OBJECTIVE  Vitals:  BP (!) 84/49   Pulse 101   Temp 98.7 °F (37.1 °C) (Temporal)   Resp 24   Wt 136 lb  0.4 oz (61.7 kg)   SpO2 98%           Physical Exam:    General Appearance: anxious, in mild to moderate distress, and tremulous  Neck: no adenopathy, no carotid bruit, no JVD, supple, symmetrical, trachea midline, and thyroid not enlarged, symmetric, no tenderness/mass/nodules  Lungs: coarse breath sounds bilaterally  Heart:  Tachycardic to 150s  Abdomen: soft, non-tender, without masses or organomegaly  Extremities: Atraumatic, no cyanosis or edema, capillary refill <3 sec.    Pulses: 2+ and symmetric all extremities  Skin: Skin color, texture, turgor normal for ethnicity, no rashes or lesions, warm and dry  Neurologic: Patient not oriented to place, is moving all extremities    Data this admission:  CT BRAIN OR HEAD (CPT=70450)    Result Date: 11/18/2024  CONCLUSION:  Slight decrease in conspicuity of subarachnoid hemorrhage.  No new bleed identified.    LOCATION:  [facility]   Dictated by (CST): Gallito Ozuna MD on 11/18/2024 at 8:18 AM     Finalized by (CST): Gallito Ozuna MD on 11/18/2024 at 8:19 AM       CT FACIAL BONES (CPT=70486)    Result Date: 11/17/2024  CONCLUSION:   1. Depressed fractures of the anterior and left lateral nasal bones with minimal displacement.  Given moderate paranasal soft tissue swelling, these fractures are likely acute.  Rightward deviation of the nasal septum without evidence of septal fracture.  2. Linear lucency traverses the left zygomatic arch.  This has a somewhat corticated margin such that this may represent a chronic injury.  Correlate with localized tenderness at this site.  3. Thin linear lucency traversing the maxillary alveolar ridge anteriorly, just left of midline.  This is suspicious for an acute nondisplaced fracture.  This traverses the root of the left central incisor.   LOCATION:  [id number]   Dictated by (CST): Rand Michael MD on 11/17/2024 at 12:16 PM     Finalized by (CST): Rand Michael MD on 11/17/2024 at 12:23 PM       CT BRAIN OR HEAD (CPT=70450)    Result  Date: 11/17/2024  CONCLUSION:  1. Punctate focus of subarachnoid hemorrhage along the paramedian right frontal lobe. 2. Partially visualized nasal bone fracture, and opacification of the right maxillary sinus, please see separate report of CT facial bone.  COMMUNICATION:  The findings were communicated with Dr. Escalante at 12 10 on 11/17/2024. There was appropriate read back.   LOCATION:  [facility]   Dictated by (CST): Candelario Ngo MD on 11/17/2024 at 12:06 PM     Finalized by (CST): Candelario Ngo MD on 11/17/2024 at 12:11 PM       Labs:  Lab Results   Component Value Date    WBC 9.5 11/21/2024    HGB 11.3 11/21/2024    HCT 33.9 11/21/2024    .0 11/21/2024    CREATSERUM 0.65 11/21/2024    BUN 6 11/21/2024     11/21/2024    K 3.7 11/21/2024     11/21/2024    CO2 23.0 11/21/2024     11/21/2024    CA 9.1 11/21/2024       Assessment/Plan:    Right frontal traumatic subarachnoid hemorrhage   -Following, they have signed off, does not appear to be any active management from that perspective anymore, patient does not have any localizing signs on exam today, so I do not think we need to repeat imaging    Toxic metabolic encephalopathy secondary to alcohol withdrawal, CIWA as high as 28 on the floor today   -Phenobarbital load   -Has gotten total of 14 mg of Ativan   -Start Precedex   -Thiamine and folic acid   -If continues to be severely bite above measures, may need to be intubated for airway protection    Nasal bone fracture    Thrombocytopenia, likely due to alcohol abuse   - Continue to trend daily    Elevated LFTs ini the setting of etoh abuse   - will hepatic function panel for today    Dispo:     Code Status: Full Code  -ICU for close monitoring    Plan of care discussed with intensivist on-call.      A total of 45 minutes of critical care time (exclusive of billable procedures) was administered. This involved direct patient intervention, complex decision making, and/or extensive discussions  (>50% face to face time) with the patient, family, and clinical staff.    The 21st Century Cures Act makes medical notes like these available to patients in the interest of transparency. Please be advised this is a medical document. Medical documents are intended to carry relevant information, facts as evident, and the clinical opinion of the practitioner. The medical note is intended as peer to peer communication and may appear blunt or direct. It is written in medical language and may contain abbreviations or verbiage that are unfamiliar.

## 2024-11-22 LAB
ALBUMIN SERPL-MCNC: 3.8 G/DL (ref 3.2–4.8)
ALBUMIN/GLOB SERPL: 1.3 {RATIO} (ref 1–2)
ALP LIVER SERPL-CCNC: 110 U/L
ALT SERPL-CCNC: 78 U/L
AMMONIA PLAS-MCNC: <10 UMOL/L (ref 11–32)
ANION GAP SERPL CALC-SCNC: 11 MMOL/L (ref 0–18)
AST SERPL-CCNC: 346 U/L (ref ?–34)
BASOPHILS # BLD AUTO: 0.06 X10(3) UL (ref 0–0.2)
BASOPHILS NFR BLD AUTO: 0.9 %
BILIRUB SERPL-MCNC: 0.6 MG/DL (ref 0.3–1.2)
BUN BLD-MCNC: 7 MG/DL (ref 9–23)
CALCIUM BLD-MCNC: 9 MG/DL (ref 8.7–10.4)
CHLORIDE SERPL-SCNC: 106 MMOL/L (ref 98–112)
CO2 SERPL-SCNC: 23 MMOL/L (ref 21–32)
CREAT BLD-MCNC: 0.56 MG/DL
EGFRCR SERPLBLD CKD-EPI 2021: 125 ML/MIN/1.73M2 (ref 60–?)
EOSINOPHIL # BLD AUTO: 0.11 X10(3) UL (ref 0–0.7)
EOSINOPHIL NFR BLD AUTO: 1.6 %
ERYTHROCYTE [DISTWIDTH] IN BLOOD BY AUTOMATED COUNT: 18.2 %
GLOBULIN PLAS-MCNC: 2.9 G/DL (ref 2–3.5)
GLUCOSE BLD-MCNC: 90 MG/DL (ref 70–99)
HAV IGM SER QL: NONREACTIVE
HBV CORE IGM SER QL: NONREACTIVE
HBV SURFACE AG SERPL QL IA: NONREACTIVE
HCT VFR BLD AUTO: 33.3 %
HCV AB SERPL QL IA: NONREACTIVE
HGB BLD-MCNC: 11 G/DL
IMM GRANULOCYTES # BLD AUTO: 0.04 X10(3) UL (ref 0–1)
IMM GRANULOCYTES NFR BLD: 0.6 %
LYMPHOCYTES # BLD AUTO: 0.84 X10(3) UL (ref 1–4)
LYMPHOCYTES NFR BLD AUTO: 12.5 %
MCH RBC QN AUTO: 29.5 PG (ref 26–34)
MCHC RBC AUTO-ENTMCNC: 33 G/DL (ref 31–37)
MCV RBC AUTO: 89.3 FL
MONOCYTES # BLD AUTO: 1.13 X10(3) UL (ref 0.1–1)
MONOCYTES NFR BLD AUTO: 16.8 %
NEUTROPHILS # BLD AUTO: 4.55 X10 (3) UL (ref 1.5–7.7)
NEUTROPHILS # BLD AUTO: 4.55 X10(3) UL (ref 1.5–7.7)
NEUTROPHILS NFR BLD AUTO: 67.6 %
OSMOLALITY SERPL CALC.SUM OF ELEC: 288 MOSM/KG (ref 275–295)
PLATELET # BLD AUTO: 205 10(3)UL (ref 150–450)
POTASSIUM SERPL-SCNC: 2.9 MMOL/L (ref 3.5–5.1)
POTASSIUM SERPL-SCNC: 3.3 MMOL/L (ref 3.5–5.1)
PROT SERPL-MCNC: 6.7 G/DL (ref 5.7–8.2)
RBC # BLD AUTO: 3.73 X10(6)UL
SODIUM SERPL-SCNC: 140 MMOL/L (ref 136–145)
WBC # BLD AUTO: 6.7 X10(3) UL (ref 4–11)

## 2024-11-22 PROCEDURE — 99233 SBSQ HOSP IP/OBS HIGH 50: CPT | Performed by: HOSPITALIST

## 2024-11-22 PROCEDURE — 99291 CRITICAL CARE FIRST HOUR: CPT | Performed by: INTERNAL MEDICINE

## 2024-11-22 RX ORDER — POTASSIUM CHLORIDE 14.9 MG/ML
20 INJECTION INTRAVENOUS ONCE
Status: COMPLETED | OUTPATIENT
Start: 2024-11-22 | End: 2024-11-22

## 2024-11-22 RX ORDER — PHENOBARBITAL SODIUM 130 MG/ML
130 INJECTION, SOLUTION INTRAMUSCULAR; INTRAVENOUS ONCE
Status: COMPLETED | OUTPATIENT
Start: 2024-11-22 | End: 2024-11-22

## 2024-11-22 NOTE — PLAN OF CARE
Assumed patient care this morning.   Alert, awake. Confused, hallucinating. Restless, agitated. Remains in restraints. CIWA protocol ongoing. Not eating. Reviewed with intensivist Dr. Nguyen regarding withdrawal symptoms and medications, wants to use ativan to help with symptoms. Large doses of ativan earlier today and still with uncontrolled symptoms. Reviewed with Tracee CASEY this afternoon and more phenobarbital ordered and administered with good response and control of withdrawal symptoms. No family at bedside.

## 2024-11-22 NOTE — PHYSICAL THERAPY NOTE
Pt being followed by physical therapy and chart reviewed. Pt transferred to ICU 11/21/24 due to worsening withdrawal symptoms and high CIWA scores. Pt impulsive and agitated in 4 point soft restraints. Pt not safe to participate in skilled therapy. PT will continue to follow.

## 2024-11-22 NOTE — DIETARY NOTE
[facility]   part of [facility]   CLINICAL NUTRITION    Amado Medina admitted on 11/17 presents with traumatic R frontal SAH s/p fall from EtOH intoxication.    PMH:  has no past medical history on file.     Admitting diagnosis:  Closed fracture of nasal bone, initial encounter [S02.2XXA]  Contusion of face, initial encounter [S00.83XA]  Alcoholic intoxication without complication (HCC) [F10.920]  Traumatic subarachnoid hemorrhage with unknown loss of consciousness status, initial encounter ([facility]) [S06.6XAA]    Ht: 154.9 cm (5' 1\")  Wt: 64.1 kg (141 lb 5 oz).   Body mass index is 26.7 kg/m².    Wt Readings from Last 6 Encounters:   11/21/24 64.1 kg (141 lb 5 oz)   02/02/21 79.4 kg (175 lb)        Labs/Meds reviewed    Diet:       Procedures    Regular/General diet Is Patient on Accuchecks? No       Percent Meals Eaten (last 3 days)       Date/Time Percent Meals Eaten (%)    11/19/24 1049 100 %    11/19/24 2000 100 %    11/20/24 1200 100 %            Pt chart reviewed d/t LOS. Pt with increasing CIWA scores yesterday and increased agitation so transferred to ICU. Now requiring restraints and Posey. Pt with good appetite/PO intake when more awake/alert. SLP re-evaluated this AM and recommends regular solids and thin liquids. Tolerating po diet without diarrhea, emesis, or constipation; last BM 11/20. No significant weight changes noted recently. Expect appetite/PO intake to improve as mentation/withdrawal improves. Will continue to monitor and follow up as appropriate.    Patient is at low nutrition risk at this time.    Please consult if patient status changes or nutrition issues arise.    Anabella Ortiz, RD, LDN, CNSC  Clinical Dietitian  Spectra: [phone]

## 2024-11-22 NOTE — PROGRESS NOTES
[facility]   part of [facility]     Hospitalist Progress Note     Amado Medina Patient Status:  Inpatient    3/22/1980 MRN [id number]   Location [facility] 6NE-A Attending Abi Blackmon MD   Hosp Day # 5 PCP None Pcp     Chief Complaint:I am fine      Subjective:     Patient remains on 4 point restraints and Posey.  Patient remains confused.    Objective:    Review of Systems:   A comprehensive review of systems was completed; pertinent positive and negatives stated in subjective.    Vital signs:  Temp:  [97.8 °F (36.6 °C)-98.7 °F (37.1 °C)] 98.1 °F (36.7 °C)  Pulse:  [0-182] 77  Resp:  [15-41] 16  BP: ()/() 103/69  SpO2:  [94 %-100 %] 100 %    Physical Exam:    General: No acute distress  Respiratory: No wheezes, no rhonchi  Cardiovascular: S1, S2, regular rate and rhythm  Abdomen: Soft, Non-tender, non-distended, positive bowel sounds  Neuro: No new focal deficits.   Extremities: No edema      Diagnostic Data:    Labs:  Recent Labs   Lab 24  1224 24  0443 24  0723 24  0402   WBC 9.9 7.8 9.5 6.7   HGB 11.7* 10.6* 11.3* 11.0*   MCV 86.5 88.3 89.7 89.3   PLT 76.0* 83.0* 197.0 205.0   INR 0.99  --   --   --        Recent Labs   Lab 24  0443 24  1103 24  0449 24  0723 24  0403   GLU 94 120*  --  117* 90   BUN 7* 8*  --  6* 7*   CREATSERUM 0.56* 0.69*  --  0.65* 0.56*   CA 8.2* 8.3*  --  9.1 9.0   ALB 3.9  --   --  4.0 3.8   * 131*  --  135* 140   K 3.6  3.6 3.4* 3.9 3.7 2.9*   CL 98 98  --  105 106   CO2 22.0 25.0  --  23.0 23.0   ALKPHO 133*  --   --  125* 110   *  --   --  407* 346*   ALT 49  --   --  81* 78*   BILT 1.2  --   --  0.5 0.6   TP 7.0  --   --  8.0 6.7       CrCl cannot be calculated (Unknown ideal weight.).    No results for input(s): \"TROP\", \"TROPHS\", \"CK\" in the last 168 hours.    Recent Labs   Lab 24  1224   PTP 13.2   INR 0.99                  Microbiology    No results found for this visit  on 11/17/24.      Imaging: Reviewed in Epic.    Medications:    potassium chloride  40 mEq Intravenous Once    Followed by    potassium chloride  20 mEq Intravenous Once    PHENObarbital  97.5 mg Intravenous Q8H    Followed by    [START ON 11/23/2024] PHENObarbital  65 mg Intravenous Q8H    Followed by    [START ON 11/25/2024] PHENObarbital  32.5 mg Intravenous Q8H    water-oil emulsion   Topical BID    thiamine  100 mg Oral Daily    multivitamin  1 tablet Oral Daily    folic acid  1 mg Oral Daily    mupirocin   Topical BID    amoxicillin potassium and clavulanate  875 mg Oral 2 times per day       Assessment & Plan:      #s/p fall   #ETOH intoxication   #Suspect Dts  -transfered to icu on 11/21 for worsening withdrawal symptoms  -phenobarb (11/21-)  s/p high dosages of ativan  -thiamine, folic acid, multivitamin  -CIWA  -IVF    #Traumatic SAH  -neuro sx eval noted from 11/18, signed off w/ repeat scans/follow up in 2 weeeks     #Thrombocytopenia , due to BM suppression from ETOH most likely  -improving     #Hypokalemia , replace   #Hyponatremia   -replace and monitor     #elevated liver enzymes due to ETOH hepatitis, improving   #nasal bone fracture,  -wound care  -pain control   -?augmentin (11/17, will plan x5 d course)  -ENT rec noted by prior hospitalsist, will reach back out to clarify        Jes Callejas,     Supplementary Documentation:     Quality:  DVT Mechanical Prophylaxis:   SCDs,    DVT Pharmacologic Prophylaxis   Medication   None                Code Status: Full Code  Alvarado: No urinary catheter in place  Alvarado Duration (in days):   Central line:    DILLON:     Discharge is dependent on: course  At this point Mr. Medina is expected to be discharge to: home    The 21st Century Cures Act makes medical notes like these available to patients in the interest of transparency. Please be advised this is a medical document. Medical documents are intended to carry relevant information, facts as evident,  and the clinical opinion of the practitioner. The medical note is intended as peer to peer communication and may appear blunt or direct. It is written in medical language and may contain abbreviations or verbiage that are unfamiliar.

## 2024-11-22 NOTE — PROGRESS NOTES
The patient was still significantly agitated with CIWA protocol scores that were quite high.  Given his continuous agitation another 5 mg/kg phenobarbital was given we will continue phenobarbital taper.  20 minutes critical care time spent with this patient with respect to management of alcohol withdrawal

## 2024-11-22 NOTE — PROGRESS NOTES
[facility]  Pulmonary/Critical Care Progress note    Amado Medina Patient Status:  Inpatient    3/22/1980 MRN [id number]   Location [facility] 4SW-A Attending Jes Callejas,    Hosp Day # 5 PCP None Pcp       History of Present Illness:  Amado Medina is a a(n) 44 year old male.  Who was found down with facedown and was brought into the emergency department on 2024 with traumatic right frontal subarachnoid hemorrhage.  Patient was admitted to the ICU.    Alert but confused pleasantly requiring soft restraints.    History:  History reviewed. No pertinent past medical history.  History reviewed. No pertinent surgical history.  No family history on file.   reports that he has never smoked. He has quit using smokeless tobacco. He reports current alcohol use. He reports that he does not use drugs.    Allergies:  Allergies[1]    Medications:    Current Facility-Administered Medications:     potassium chloride 40 mEq in 250mL sodium chloride 0.9% IVPB premix, 40 mEq, Intravenous, Once **FOLLOWED BY** potassium chloride 20 mEq/100mL IVPB premix 20 mEq, 20 mEq, Intravenous, Once    metoprolol tartrate (Lopressor) tab 25 mg, 25 mg, Oral, BID PRN    LORazepam (Ativan) tab 1 mg, 1 mg, Oral, Q1H PRN **OR** LORazepam (Ativan) tab 2 mg, 2 mg, Oral, Q1H PRN    LORazepam (Ativan) tab 1 mg, 1 mg, Oral, Q1H PRN **OR** LORazepam (Ativan) 2 mg/mL injection 1 mg, 1 mg, Intravenous, Q1H PRN    LORazepam (Ativan) tab 2 mg, 2 mg, Oral, Q1H PRN **OR** LORazepam (Ativan) 2 mg/mL injection 2 mg, 2 mg, Intravenous, Q1H PRN    LORazepam (Ativan) tab 3 mg, 3 mg, Oral, Q1H PRN **OR** LORazepam (Ativan) 2 mg/mL injection 3 mg, 3 mg, Intravenous, Q1H PRN    LORazepam (Ativan) 2 mg/mL injection 4 mg, 4 mg, Intravenous, Q30 Min PRN    LORazepam (Ativan) 2 mg/mL injection 5 mg, 5 mg, Intravenous, Q15 Min PRN    LORazepam (Ativan) 2 mg/mL injection 6 mg, 6 mg, Intravenous, Q10 Min PRN    haloperidol lactate (Haldol) 5  MG/ML injection 2 mg, 2 mg, Intravenous, Q8H PRN    [COMPLETED] PHENobarbital (Luminal) 130 mg/mL injection 260 mg, 260 mg, Intravenous, Once **FOLLOWED BY** PHENobarbital (Luminal) 130 mg/mL injection 97.5 mg, 97.5 mg, Intravenous, Q8H **FOLLOWED BY** [START ON 11/23/2024] PHENobarbital (Luminal) 130 mg/mL injection 65 mg, 65 mg, Intravenous, Q8H **FOLLOWED BY** [START ON 11/25/2024] PHENobarbital (Luminal) 130 mg/mL injection 32.5 mg, 32.5 mg, Intravenous, Q8H    dexmedeTOMIDine in sodium chloride 0.9% (Precedex) 400 mcg/100mL infusion premix, 0.2-1.5 mcg/kg/hr (Dosing Weight), Intravenous, Continuous    lactated ringers infusion, , Intravenous, Continuous    dexmedeTOMIDine in sodium chloride 0.9% (Precedex) 400 mcg/100mL infusion premix, 0.2-1.5 mcg/kg/hr (Dosing Weight), Intravenous, Continuous    water-oil emulsion (Eucirin) cream, , Topical, BID    labetalol (Trandate) 5 mg/mL injection 10 mg, 10 mg, Intravenous, Q10 Min PRN    hydrALAzine (Apresoline) 20 mg/mL injection 10 mg, 10 mg, Intravenous, Q2H PRN    acetaminophen (Tylenol) tab 650 mg, 650 mg, Oral, Q4H PRN **OR** acetaminophen (Tylenol) rectal suppository 650 mg, 650 mg, Rectal, Q4H PRN    ondansetron (Zofran) 4 MG/2ML injection 4 mg, 4 mg, Intravenous, Q6H PRN **OR** metoclopramide (Reglan) 5 mg/mL injection 10 mg, 10 mg, Intravenous, Q8H PRN    thiamine (Vitamin B1) tab 100 mg, 100 mg, Oral, Daily    multivitamin (Tab-A-Gela/Beta Carotene) tab 1 tablet, 1 tablet, Oral, Daily    folic acid (Folvite) tab 1 mg, 1 mg, Oral, Daily    mupirocin (Bactroban) 2 % ointment, , Topical, BID    amoxicillin clavulanate (Augmentin) 875-125 MG per tab 875 mg, 875 mg, Oral, 2 times per day    Intake/Output:    Intake/Output Summary (Last 24 hours) at 11/22/2024 0801  Last data filed at 11/22/2024 0545  Gross per 24 hour   Intake 1670.99 ml   Output 1305 ml   Net 365.99 ml      There is no height or weight on file to calculate BMI.    Review of Systems  Review of  Systems:   A comprehensive 10 point review of systems was completed.  Pertinent positives and negatives noted in the the HPI.       Patient Vitals for the past 24 hrs:   BP Temp Temp src Pulse Resp SpO2 Weight   11/22/24 0800 -- 99.5 °F (37.5 °C) Temporal -- -- -- --   11/22/24 0600 103/69 -- -- 77 16 100 % --   11/22/24 0500 102/70 -- -- 75 15 100 % --   11/22/24 0400 95/63 98.1 °F (36.7 °C) Temporal 75 15 98 % --   11/22/24 0300 96/70 -- -- 78 16 97 % --   11/22/24 0200 98/63 -- -- 82 18 96 % --   11/22/24 0100 99/59 -- -- 89 20 95 % --   11/22/24 0022 (!) 117/93 -- -- 115 (!) 27 96 % --   11/22/24 0000 139/90 97.8 °F (36.6 °C) Temporal (!) 134 24 97 % --   11/21/24 2334 (!) 143/110 -- -- (!) 136 (!) 27 100 % --   11/21/24 2300 -- -- -- (!) 144 22 -- --   11/21/24 2200 -- -- -- (!) 130 24 99 % --   11/21/24 2150 -- -- -- 120 26 97 % --   11/21/24 2100 141/90 -- -- (!) 138 26 95 % --   11/21/24 2000 145/84 98 °F (36.7 °C) Temporal (!) 138 26 98 % --   11/21/24 1900 137/88 -- -- (!) 134 23 99 % --   11/21/24 1830 100/67 -- -- 78 16 100 % --   11/21/24 1800 94/67 -- -- 80 16 99 % --   11/21/24 1730 96/66 -- -- 80 16 99 % --   11/21/24 1700 110/75 -- -- 82 17 99 % --   11/21/24 1630 (!) 85/58 -- -- 81 (!) 32 98 % --   11/21/24 1600 (!) 85/60 97.8 °F (36.6 °C) Temporal 82 20 99 % 141 lb 5 oz (64.1 kg)   11/21/24 1530 (!) 86/58 -- -- 85 21 98 % --   11/21/24 1515 (!) 88/56 -- -- 86 20 98 % --   11/21/24 1500 (!) 88/53 -- -- 86 21 98 % --   11/21/24 1430 (!) 85/54 -- -- 93 22 98 % --   11/21/24 1415 (!) 84/49 -- -- 101 24 98 % --   11/21/24 1400 (!) 84/54 -- -- 110 26 96 % --   11/21/24 1355 (!) 80/56 -- -- 113 26 96 % --   11/21/24 1350 (!) 77/56 -- -- 117 (!) 27 95 % --   11/21/24 1340 -- -- -- (!) 139 (!) 30 95 % --   11/21/24 1338 97/68 -- -- (!) 135 24 96 % --   11/21/24 1330 112/63 -- -- (!) 148 25 97 % --   11/21/24 1325 128/80 -- -- (!) 145 (!) 41 96 % --   11/21/24 1320 (!) 122/103 -- -- (!) 165 (!) 27 97 % --    11/21/24 1310 -- -- -- (!) 168 (!) 38 94 % --   11/21/24 1307 -- -- -- (!) 151 (!) 30 94 % --   11/21/24 1300 -- -- -- (!) 169 (!) 37 97 % --   11/21/24 1250 -- -- -- (!) 147 25 97 % --   11/21/24 1240 (!) 150/106 -- -- (!) 182 (!) 32 96 % --   11/21/24 1230 (!) 137/102 98.7 °F (37.1 °C) Temporal (!) 151 23 95 % --   11/21/24 1000 (!) 145/101 -- -- (!) 163 -- -- --   11/21/24 0845 -- -- -- (!) 0 -- -- --     Vitals:    11/22/24 0400 11/22/24 0500 11/22/24 0600 11/22/24 0800   BP: 95/63 102/70 103/69    BP Location: Right arm      Pulse: 75 75 77    Resp: 15 15 16    Temp: 98.1 °F (36.7 °C)   99.5 °F (37.5 °C)   TempSrc: Temporal   Temporal   SpO2: 98% 100% 100%    Weight:             Physical Exam  Constitutional:       General: He is not in acute distress.     Appearance: Normal appearance. He is not ill-appearing or diaphoretic.   HENT:      Head: Normocephalic and atraumatic.      Nose: Nose normal. No congestion or rhinorrhea.      Mouth/Throat:      Mouth: Mucous membranes are moist.      Pharynx: Oropharynx is clear. No oropharyngeal exudate or posterior oropharyngeal erythema.      Comments: Mallampati class III palate   Eyes:      Extraocular Movements: Extraocular movements intact.      Pupils: Pupils are equal, round, and reactive to light.   Cardiovascular:      Rate and Rhythm: Normal rate.      Pulses: Normal pulses.      Heart sounds: Normal heart sounds. No murmur heard.  Pulmonary:      Effort: Pulmonary effort is normal. No respiratory distress.      Breath sounds: Normal breath sounds. No wheezing or rhonchi.   Chest:      Chest wall: No tenderness.   Abdominal:      General: Abdomen is flat. Bowel sounds are normal.      Palpations: Abdomen is soft.   Musculoskeletal:         General: Normal range of motion.      Comments: Soft restraints    Skin:     General: Skin is warm.   Neurological:      General: No focal deficit present.      Mental Status: He is alert.      Comments: Confused    Psychiatric:      Comments: Confused                 Lab Data Review:  Recent Labs   Lab 11/18/24  0443 11/21/24  0723 11/22/24  0402   WBC 7.8 9.5 6.7   HGB 10.6* 11.3* 11.0*   HCT 31.8* 33.9* 33.3*   PLT 83.0* 197.0 205.0       Recent Labs   Lab 11/18/24  0443 11/18/24  1103 11/19/24  0449 11/21/24  0723 11/22/24  0403   * 131*  --  135* 140   K 3.6  3.6 3.4* 3.9 3.7 2.9*   CL 98 98  --  105 106   CO2 22.0 25.0  --  23.0 23.0   BUN 7* 8*  --  6* 7*   CREATSERUM 0.56* 0.69*  --  0.65* 0.56*   CA 8.2* 8.3*  --  9.1 9.0   ALB 3.9  --   --  4.0 3.8   ALKPHO 133*  --   --  125* 110   ALT 49  --   --  81* 78*   *  --   --  407* 346*   GLU 94 120*  --  117* 90       No results for input(s): \"MG\" in the last 168 hours.    No results found for: \"PHOS\", \"PHOSPHORUS\"     Recent Labs   Lab 11/17/24  1224   INR 0.99   PTT 28.5       No results for input(s): \"ABGPHT\", \"VMUDDD1B\", \"TQGRP4Z\", \"ABGHCO3\", \"ABGBE\", \"TEMP\", \"SIOBHAN\", \"SITE\", \"DEV\", \"THGB\" in the last 168 hours.    No results for input(s): \"TROP\", \"CKMB\" in the last 168 hours.    Cultures: No results found for this visit on 11/17/24.        Radiology personally reviewed:  No results found.     Patient Active Problem List   Diagnosis    Traumatic subarachnoid hemorrhage with unknown loss of consciousness status, initial encounter ([facility])    Alcoholic intoxication with complication (HCC)    Hypokalemia    Closed fracture of nasal bones    Thrombocytopenia (HCC)    Contusion of face, initial encounter    Closed fracture of nasal bone, initial encounter    Alcoholic intoxication without complication (HCC)       Assessment/plan:    Pulmonary:  Stable    Plan:  Continue monitoring    Cardiovascular:  Stable    Plan:      Hematologic/coagulation  Thrombocytopenia, likely due to alcohol abuse         Plan:  Continue to monitor platelet count    Renal/FEN  Hyponatremia: Improved    Plan:      Endocrine/DEM  Stable    Plan:      Gastroenterology/hepatology    Elevated LFTs ini the setting of etoh abuse      Plan:  Monitor LFTs    Infectious disease:  Facial bone fracture    Plan:  Augmentin 5 day course for prophylaxis after a fall and facial injury    Neurologic/psychiatry  Right frontal traumatic subarachnoid hemorrhage  Toxic metabolic encephalopathy secondary to alcohol withdrawal, CIWA as high as 28 on the floor today      Plan:  Continue CIWA protocol   Continue Phenobarbitone 1.5mg/kg q 8 hours and bolus as needed 250 mg IV X1   Ativan PRN   Precedex as needed  Thiamine and folate        Musculoskeletal/rheumatology  4 point restrains for patien/ staff t protection     Prophylaxis:    DVT prophylaxis: SCD boots   GI prophylaxis: ---    Lines:  PIV  Catheters:  Feeding:     Disposition:  ICU monitoring       CODE STATUS: Full code      Luis Miguel Nguyen MD    30  minutes of critical care time were spent at the bedside performing history, physical, complex data interpretation, radiographic interpretation, discussion with consultants, and creating a diagnostic and therapeutic treatment plan for this critically ill patient. Time spent was excluding time on procedures and resident teaching.     Note to the patient: The 21st Century Cures Act makes medical notes like these available to patients in the interest of transparency. However, be advised that this is a medical document. It is intended as peer to peer communication. It is written in medical language and may contain abbreviations or verbiage that are unfamiliar. It may appear blunt or direct. Medical documents are intended to carry relevant information, facts as evident, and clinical opinion of the practitioner.      Disclaimer: Components of this note were documented using voice recognition system and are subject to errors not corrected at proofreading. Contact the author of this note for any clarifications          [1] No Known Allergies

## 2024-11-22 NOTE — PLAN OF CARE
Pt alert and oriented x1, [place] speaking only,  used. Follows commands. Very impulsive and agitated. 4 point soft restraints in place to maintain safety. CIWA protocol in place. PRN ativan given for withdrawal symptoms see MAR. loading dose given per ICU MD Dr. Palomino.     See MAR and flowsheets for additional information.

## 2024-11-22 NOTE — SLP NOTE
SPEECH DAILY NOTE - INPATIENT    ASSESSMENT & PLAN   ASSESSMENT  Pt seen for dysphagia tx to assess tolerance with recommended diet, ensure proper utilization of aspiration precautions and provide pt/family education.  Patient alert but confused in bed. He remains in four point restraints. PO trials of thin liquid and regular solids provided. Bolus acceptance was adequate without evidence of anterior bolus loss. Mastication and AP bolus transit were thorough and efficient without evidence of oral residue. No overt s/s of aspiration observed and patient denied odynophagia and globus sensation across consistencies. Recommend patient continue a regular diet and thin liquids. SLP will continue to follow per POC.    Diet Recommendations - Solids: Regular  Diet Recommendations - Liquids: Thin Liquids    Compensatory Strategies Recommended: Slow rate;Small bites and sips  Aspiration Precautions: Upright position;Slow rate;Small bites and sips  Medication Administration Recommendations:  (as tolerated)    Patient Experiencing Pain: No      Treatment Plan  Treatment Plan/Recommendations: Aspiration precautions;Communication evaluation    Interdisciplinary Communication: Discussed with RN            GOALS  Goal #1 The patient will tolerate regular consistency and thin liquids without overt signs or symptoms of aspiration with 100 % accuracy over 1-2 session(s).  In Progress   Goal #2 The patient/family/caregiver will demonstrate understanding and implementation of aspiration precautions and swallow strategies independently over 1-2 session(s).     In Progress   Goal #3 Patient will participate in communication assessment as clinically appropriate  In Progress        FOLLOW UP  Follow Up Needed (Documentation Required): Yes  SLP Follow-up Date: 11/26/24  Number of Visits to Meet Established Goals: 2    Session: 1    If you have any questions, please contact ADELINE [place]

## 2024-11-23 ENCOUNTER — APPOINTMENT (OUTPATIENT)
Dept: GENERAL RADIOLOGY | Facility: HOSPITAL | Age: 44
End: 2024-11-23
Attending: INTERNAL MEDICINE

## 2024-11-23 LAB
ALBUMIN SERPL-MCNC: 3.5 G/DL (ref 3.2–4.8)
ALBUMIN/GLOB SERPL: 1.3 {RATIO} (ref 1–2)
ALP LIVER SERPL-CCNC: 98 U/L
ALT SERPL-CCNC: 65 U/L
ANION GAP SERPL CALC-SCNC: 11 MMOL/L (ref 0–18)
AST SERPL-CCNC: 224 U/L (ref ?–34)
BASOPHILS # BLD AUTO: 0.09 X10(3) UL (ref 0–0.2)
BASOPHILS NFR BLD AUTO: 1 %
BILIRUB SERPL-MCNC: 0.5 MG/DL (ref 0.3–1.2)
BUN BLD-MCNC: 6 MG/DL (ref 9–23)
CALCIUM BLD-MCNC: 8.9 MG/DL (ref 8.7–10.4)
CHLORIDE SERPL-SCNC: 109 MMOL/L (ref 98–112)
CO2 SERPL-SCNC: 18 MMOL/L (ref 21–32)
CREAT BLD-MCNC: 0.56 MG/DL
EGFRCR SERPLBLD CKD-EPI 2021: 125 ML/MIN/1.73M2 (ref 60–?)
EOSINOPHIL # BLD AUTO: 0.22 X10(3) UL (ref 0–0.7)
EOSINOPHIL NFR BLD AUTO: 2.5 %
ERYTHROCYTE [DISTWIDTH] IN BLOOD BY AUTOMATED COUNT: 18.3 %
GLOBULIN PLAS-MCNC: 2.8 G/DL (ref 2–3.5)
GLUCOSE BLD-MCNC: 101 MG/DL (ref 70–99)
GLUCOSE BLD-MCNC: 104 MG/DL (ref 70–99)
GLUCOSE BLD-MCNC: 125 MG/DL (ref 70–99)
GLUCOSE BLD-MCNC: 208 MG/DL (ref 70–99)
GLUCOSE BLD-MCNC: 63 MG/DL (ref 70–99)
GLUCOSE BLD-MCNC: 68 MG/DL (ref 70–99)
GLUCOSE BLD-MCNC: 75 MG/DL (ref 70–99)
GLUCOSE BLD-MCNC: 96 MG/DL (ref 70–99)
GLUCOSE BLD-MCNC: 97 MG/DL (ref 70–99)
HCT VFR BLD AUTO: 32.9 %
HGB BLD-MCNC: 10.2 G/DL
IMM GRANULOCYTES # BLD AUTO: 0.03 X10(3) UL (ref 0–1)
IMM GRANULOCYTES NFR BLD: 0.3 %
LYMPHOCYTES # BLD AUTO: 0.96 X10(3) UL (ref 1–4)
LYMPHOCYTES NFR BLD AUTO: 10.7 %
MAGNESIUM SERPL-MCNC: 1.5 MG/DL (ref 1.6–2.6)
MCH RBC QN AUTO: 29.4 PG (ref 26–34)
MCHC RBC AUTO-ENTMCNC: 31 G/DL (ref 31–37)
MCV RBC AUTO: 94.8 FL
MONOCYTES # BLD AUTO: 1.19 X10(3) UL (ref 0.1–1)
MONOCYTES NFR BLD AUTO: 13.3 %
NEUTROPHILS # BLD AUTO: 6.48 X10 (3) UL (ref 1.5–7.7)
NEUTROPHILS # BLD AUTO: 6.48 X10(3) UL (ref 1.5–7.7)
NEUTROPHILS NFR BLD AUTO: 72.2 %
OSMOLALITY SERPL CALC.SUM OF ELEC: 282 MOSM/KG (ref 275–295)
PHOSPHATE SERPL-MCNC: 4.2 MG/DL (ref 2.4–5.1)
PLATELET # BLD AUTO: 236 10(3)UL (ref 150–450)
POTASSIUM SERPL-SCNC: 3.8 MMOL/L (ref 3.5–5.1)
POTASSIUM SERPL-SCNC: 3.8 MMOL/L (ref 3.5–5.1)
PROT SERPL-MCNC: 6.3 G/DL (ref 5.7–8.2)
RBC # BLD AUTO: 3.47 X10(6)UL
SODIUM SERPL-SCNC: 138 MMOL/L (ref 136–145)
WBC # BLD AUTO: 9 X10(3) UL (ref 4–11)

## 2024-11-23 PROCEDURE — 99291 CRITICAL CARE FIRST HOUR: CPT | Performed by: INTERNAL MEDICINE

## 2024-11-23 PROCEDURE — 71045 X-RAY EXAM CHEST 1 VIEW: CPT | Performed by: INTERNAL MEDICINE

## 2024-11-23 PROCEDURE — 99233 SBSQ HOSP IP/OBS HIGH 50: CPT | Performed by: HOSPITALIST

## 2024-11-23 RX ORDER — THIAMINE HYDROCHLORIDE 100 MG/ML
500 INJECTION, SOLUTION INTRAMUSCULAR; INTRAVENOUS EVERY 8 HOURS SCHEDULED
Status: COMPLETED | OUTPATIENT
Start: 2024-11-23 | End: 2024-11-25

## 2024-11-23 RX ORDER — ENOXAPARIN SODIUM 100 MG/ML
40 INJECTION SUBCUTANEOUS DAILY
Status: DISCONTINUED | OUTPATIENT
Start: 2024-11-23 | End: 2024-11-29

## 2024-11-23 RX ORDER — DEXTROSE MONOHYDRATE 25 G/50ML
50 INJECTION, SOLUTION INTRAVENOUS
Status: DISCONTINUED | OUTPATIENT
Start: 2024-11-23 | End: 2024-11-29

## 2024-11-23 RX ORDER — DEXTROSE MONOHYDRATE 25 G/50ML
INJECTION, SOLUTION INTRAVENOUS
Status: COMPLETED
Start: 2024-11-23 | End: 2024-11-23

## 2024-11-23 RX ORDER — THIAMINE HYDROCHLORIDE 100 MG/ML
100 INJECTION, SOLUTION INTRAMUSCULAR; INTRAVENOUS DAILY
Status: DISCONTINUED | OUTPATIENT
Start: 2024-11-29 | End: 2024-11-29

## 2024-11-23 RX ORDER — NICOTINE POLACRILEX 4 MG
15 LOZENGE BUCCAL
Status: DISCONTINUED | OUTPATIENT
Start: 2024-11-23 | End: 2024-11-29

## 2024-11-23 RX ORDER — DEXTROSE, SODIUM CHLORIDE, SODIUM LACTATE, POTASSIUM CHLORIDE, AND CALCIUM CHLORIDE 5; .6; .31; .03; .02 G/100ML; G/100ML; G/100ML; G/100ML; G/100ML
INJECTION, SOLUTION INTRAVENOUS CONTINUOUS
Status: DISCONTINUED | OUTPATIENT
Start: 2024-11-23 | End: 2024-11-24

## 2024-11-23 RX ORDER — THIAMINE HYDROCHLORIDE 100 MG/ML
250 INJECTION, SOLUTION INTRAMUSCULAR; INTRAVENOUS DAILY
Status: COMPLETED | OUTPATIENT
Start: 2024-11-26 | End: 2024-11-28

## 2024-11-23 RX ORDER — NICOTINE POLACRILEX 4 MG
30 LOZENGE BUCCAL
Status: DISCONTINUED | OUTPATIENT
Start: 2024-11-23 | End: 2024-11-29

## 2024-11-23 NOTE — PLAN OF CARE
Received pt somnolent, unable to follow commands, withdraws to pain on all extremities. Intermittent agitation with bilateral wrist and ankle restraints and vest in place for patient safety. Precedex gtt per MAR. CIWA per protocol. On 1L nasal cannula with ETCO2 monitor in place. Diminished breath sounds. Afebrile. BP stable. NSR on tele. No BM. Urinary incontinence. Electrolytes replaced per protocol.  No further needs at this time.

## 2024-11-23 NOTE — PROGRESS NOTES
Amado LandisHaydeeVinay Patient Status:  Inpatient    3/22/1980 MRN [id number]   Location [facility] 4SW-A Attending Jes Callejas, DO   Hosp Day # 6 PCP None Pcp     Critical Care Progress Note          Subjective:  Remains on IV phenobarb taper. Less restless today.    Objective:    Medications:   magnesium sulfate  4 g Intravenous Once    PHENObarbital  97.5 mg Intravenous Q8H    Followed by    PHENObarbital  65 mg Intravenous Q8H    Followed by    [START ON 2024] PHENObarbital  32.5 mg Intravenous Q8H    water-oil emulsion   Topical BID    thiamine  100 mg Oral Daily    multivitamin  1 tablet Oral Daily    folic acid  1 mg Oral Daily    mupirocin   Topical BID    amoxicillin potassium and clavulanate  875 mg Oral 2 times per day              Intake/Output Summary (Last 24 hours) at 2024 0709  Last data filed at 2024 0607  Gross per 24 hour   Intake 1156 ml   Output 600 ml   Net 556 ml       BP 95/65 (BP Location: Right arm)   Pulse 64   Temp 97.8 °F (36.6 °C) (Temporal)   Resp 12   Ht 154.9 cm (5' 1\")   Wt 141 lb 5 oz (64.1 kg)   SpO2 100%   BMI 26.70 kg/m²     Physical Exam:   General Appearance: Alert, cooperative, no distress, appears stated age  Neck: No JVD, neck supple, no adenopathy, trachea midline  Lungs: Clear to auscultation bilaterally, respirations unlabored  Heart: Regular rate and rhythm, S1 and S2 normal, no murmur, rub or gallop  Abdomen: Soft, non-tender, bowel sounds active all four quadrants, no masses, no organomegaly  Extremities: Extremities normal, atraumatic, no cyanosis or edema,capillary refill <3 sec.    Pulses: 2+ and symmetric all extremities  Skin: Skin color, texture, turgor normal for ethnicity, no rashes or lesions, warm and dry      Recent Labs   Lab 24  0345   RBC 3.47*   HGB 10.2*   HCT 32.9*   MCV 94.8   MCH 29.4   MCHC 31.0   RDW 18.3   NEPRELIM 6.48   WBC 9.0   .0     Recent Labs   Lab 24  0723 24  0403 24  1458  11/23/24  0345   * 90  --  68*   BUN 6* 7*  --  6*   CREATSERUM 0.65* 0.56*  --  0.56*   CA 9.1 9.0  --  8.9   ALB 4.0 3.8  --  3.5   * 140  --  138   K 3.7 2.9* 3.3* 3.8  3.8    106  --  109   CO2 23.0 23.0  --  18.0*   ALKPHO 125* 110  --  98   * 346*  --  224*   ALT 81* 78*  --  65*   BILT 0.5 0.6  --  0.5   TP 8.0 6.7  --  6.3     No results for input(s): \"ABGPHT\", \"YPJZVT0P\", \"VJXNW1Y\", \"ABGHCO3\", \"ABGBE\", \"TEMP\", \"SIOBHAN\", \"SITE\", \"DEV\", \"THGB\" in the last 168 hours.    Invalid input(s): \"UWP29MWS\", \"CHOB\"  No results for input(s): \"BNP\" in the last 168 hours.  No results for input(s): \"TROP\", \"CK\" in the last 168 hours.    No results found.       Assessment/Plan:    Amado Mclean is a 44 year old male who presented to the ED 11/17 after he was found face down near his apartment who was admitted to the ICU for a traumatic SAH, and ETOH withdrawal. Neurosurgery was consulted for which no interventions were recommended. He was transferred to the general medicine floor and started to go through alcohol withdrawal. He was brought to ICU 11/21 for IV phenobarb load/taper and further management of alcohol withdrawal.    Traumatic right frontal subarachnoid hemorrhage 2/2 to etoh related fall  - INR 0.99, plts 76 on admission s/p 1u plt transfusion- now improved.  - Repeat head CT stable  - Neurosurgery eval with no planned intervention. Now signed off.  - Repeat head CT in 2 weeks  - Neuro critical care signed off    ETOH use disorder  ETOH withdrawal  - Last drink 11/17, ethyl alcohol level 379 on admission  - CIWA protocol, PRN ativan  - Scheduled IV phenobarb taper- ETCO2 monitoring. Has required multiple additional phenobarb doses.  - PRN haldol  - PRN precedex  - MV/thiamine/folic acid  - IVF  - Seizure precautions  - Will start high dose thiamine today given reaching end of withdrawal window  - Psych liaison to see    Nasal bridge fracture/abrasion 2/2 to fall  - Wound care  following  - ENT consulted  - s/p Augmentin (11/17-11/23)    Hypoglycemia- likely 2/2 to poor PO intake  - s/p hypoglycemia protocol  - Encourage PO intake  - Switch IVF to D5LR  - May need to insert DHT and start tube feeds today if no improvement in mental status    Transaminitis- improving  - Likely 2/2 to etoh  - AST//65, tbili, alk phos normal  - Ammonia normal  - Daily CMP    F/E/N  - IVF  - Replete electrolytes per protocol  - ADAT    Ppx  - Will check with neurology regarding dvt chemoppx.   - SCDs  - PT/OT      Dispo:   - Full code  - ICU monitoring      Plan of care discussed with intensivist, Dr. Nguyen.      Tracee Sanchez [facility]  Critical Care  [id number]

## 2024-11-23 NOTE — PROGRESS NOTES
[facility]   part of [facility]     Hospitalist Progress Note     Amado Medina Patient Status:  Inpatient    3/22/1980 MRN [id number]   Location [facility] 6NE-A Attending Abi Blackmon MD   Hosp Day # 6 PCP None Pcp     Chief Complaint:I am fine      Subjective:     Patient remains in restraints.  Precedex drip running intermittently.  Patient not lucid.    Objective:    Review of Systems:   A comprehensive review of systems was completed; pertinent positive and negatives stated in subjective.    Vital signs:  Temp:  [97.8 °F (36.6 °C)-98.7 °F (37.1 °C)] 97.8 °F (36.6 °C)  Pulse:  [] 64  Resp:  [12-26] 12  BP: ()/(45-77) 105/74  SpO2:  [95 %-100 %] 100 %    Physical Exam:    General: No acute distress  Respiratory: No wheezes, no rhonchi  Cardiovascular: S1, S2, regular rate and rhythm  Abdomen: Soft, Non-tender, non-distended, positive bowel sounds  Neuro: No new focal deficits.   Extremities: No edema      Diagnostic Data:    Labs:  Recent Labs   Lab 24  1224 24  0443 24  0723 24  0402 24  0345   WBC 9.9 7.8 9.5 6.7 9.0   HGB 11.7* 10.6* 11.3* 11.0* 10.2*   MCV 86.5 88.3 89.7 89.3 94.8   PLT 76.0* 83.0* 197.0 205.0 236.0   INR 0.99  --   --   --   --        Recent Labs   Lab 24  0723 24  0403 24  1458 24  0345   * 90  --  68*   BUN 6* 7*  --  6*   CREATSERUM 0.65* 0.56*  --  0.56*   CA 9.1 9.0  --  8.9   ALB 4.0 3.8  --  3.5   * 140  --  138   K 3.7 2.9* 3.3* 3.8  3.8    106  --  109   CO2 23.0 23.0  --  18.0*   ALKPHO 125* 110  --  98   * 346*  --  224*   ALT 81* 78*  --  65*   BILT 0.5 0.6  --  0.5   TP 8.0 6.7  --  6.3       Estimated Creatinine Clearance: 125.8 mL/min (A) (based on SCr of 0.56 mg/dL (L)).    No results for input(s): \"TROP\", \"TROPHS\", \"CK\" in the last 168 hours.    Recent Labs   Lab 24  1224   PTP 13.2   INR 0.99                  Microbiology    No results found for this  visit on 11/17/24.      Imaging: Reviewed in Epic.    Medications:    thiamine  500 mg Intravenous Q8H ANGELIKA    Followed by    [START ON 11/26/2024] thiamine  250 mg Intravenous Daily    Followed by    [START ON 11/29/2024] thiamine  100 mg Intravenous Daily    enoxaparin  40 mg Subcutaneous Daily    PHENObarbital  97.5 mg Intravenous Q8H    Followed by    PHENObarbital  65 mg Intravenous Q8H    Followed by    [START ON 11/25/2024] PHENObarbital  32.5 mg Intravenous Q8H    water-oil emulsion   Topical BID    multivitamin  1 tablet Oral Daily    folic acid  1 mg Oral Daily    mupirocin   Topical BID       Assessment & Plan:      #s/p fall   #ETOH intoxication   #Suspect Dts  -transfered to icu on 11/21 for worsening withdrawal symptoms  -phenobarb (11/21-)  s/p high dosages of ativan  -Precedex drip  -thiamine, folic acid, multivitamin  -CIWA  -IVF    #Non anion gap metabolic acidosis  -Continue IV fluids for now, will monitor    #Traumatic SAH  -neuro sx eval noted from 11/18, signed off w/ repeat scans/follow up in 2 weeeks     #Thrombocytopenia , due to BM suppression from ETOH most likely  -improving     #Hypokalemia , replace   #Hyponatremia   -replace and monitor     #elevated liver enzymes due to ETOH hepatitis, improving   #nasal bone fracture,  -wound care  -pain control   -?augmentin (11/17, will plan x5 d course)  -ENT to see, recommendations appreciated      Jes Callejas, DO    Supplementary Documentation:     Quality:  DVT Mechanical Prophylaxis:   SCDs,    DVT Pharmacologic Prophylaxis   Medication    enoxaparin (Lovenox) 40 MG/0.4ML SUBQ injection 40 mg                Code Status: Full Code  Alvarado: No urinary catheter in place  Alvarado Duration (in days):   Central line:    DILLON:     Discharge is dependent on: course  At this point Mr. Medina is expected to be discharge to: home    The 21st Century Cures Act makes medical notes like these available to patients in the interest of transparency.  Please be advised this is a medical document. Medical documents are intended to carry relevant information, facts as evident, and the clinical opinion of the practitioner. The medical note is intended as peer to peer communication and may appear blunt or direct. It is written in medical language and may contain abbreviations or verbiage that are unfamiliar.

## 2024-11-24 LAB
ALBUMIN SERPL-MCNC: 3.4 G/DL (ref 3.2–4.8)
ALBUMIN/GLOB SERPL: 1.3 {RATIO} (ref 1–2)
ALP LIVER SERPL-CCNC: 95 U/L
ALT SERPL-CCNC: 53 U/L
ANION GAP SERPL CALC-SCNC: 8 MMOL/L (ref 0–18)
AST SERPL-CCNC: 133 U/L (ref ?–34)
BASOPHILS # BLD AUTO: 0.09 X10(3) UL (ref 0–0.2)
BASOPHILS NFR BLD AUTO: 1.5 %
BILIRUB SERPL-MCNC: 0.4 MG/DL (ref 0.3–1.2)
BUN BLD-MCNC: 7 MG/DL (ref 9–23)
CALCIUM BLD-MCNC: 8.7 MG/DL (ref 8.7–10.4)
CHLORIDE SERPL-SCNC: 106 MMOL/L (ref 98–112)
CO2 SERPL-SCNC: 25 MMOL/L (ref 21–32)
CREAT BLD-MCNC: 0.63 MG/DL
EGFRCR SERPLBLD CKD-EPI 2021: 121 ML/MIN/1.73M2 (ref 60–?)
EOSINOPHIL # BLD AUTO: 0.17 X10(3) UL (ref 0–0.7)
EOSINOPHIL NFR BLD AUTO: 2.9 %
ERYTHROCYTE [DISTWIDTH] IN BLOOD BY AUTOMATED COUNT: 17.6 %
GLOBULIN PLAS-MCNC: 2.7 G/DL (ref 2–3.5)
GLUCOSE BLD-MCNC: 101 MG/DL (ref 70–99)
GLUCOSE BLD-MCNC: 109 MG/DL (ref 70–99)
GLUCOSE BLD-MCNC: 141 MG/DL (ref 70–99)
GLUCOSE BLD-MCNC: 157 MG/DL (ref 70–99)
HCT VFR BLD AUTO: 33.6 %
HGB BLD-MCNC: 10.9 G/DL
IMM GRANULOCYTES # BLD AUTO: 0.03 X10(3) UL (ref 0–1)
IMM GRANULOCYTES NFR BLD: 0.5 %
LYMPHOCYTES # BLD AUTO: 0.7 X10(3) UL (ref 1–4)
LYMPHOCYTES NFR BLD AUTO: 12 %
MAGNESIUM SERPL-MCNC: 1.5 MG/DL (ref 1.6–2.6)
MAGNESIUM SERPL-MCNC: 1.5 MG/DL (ref 1.6–2.6)
MCH RBC QN AUTO: 29.9 PG (ref 26–34)
MCHC RBC AUTO-ENTMCNC: 32.4 G/DL (ref 31–37)
MCV RBC AUTO: 92.1 FL
MONOCYTES # BLD AUTO: 1.07 X10(3) UL (ref 0.1–1)
MONOCYTES NFR BLD AUTO: 18.3 %
NEUTROPHILS # BLD AUTO: 3.78 X10 (3) UL (ref 1.5–7.7)
NEUTROPHILS # BLD AUTO: 3.78 X10(3) UL (ref 1.5–7.7)
NEUTROPHILS NFR BLD AUTO: 64.8 %
OSMOLALITY SERPL CALC.SUM OF ELEC: 289 MOSM/KG (ref 275–295)
PLATELET # BLD AUTO: 319 10(3)UL (ref 150–450)
POTASSIUM SERPL-SCNC: 3.6 MMOL/L (ref 3.5–5.1)
PROT SERPL-MCNC: 6.1 G/DL (ref 5.7–8.2)
RBC # BLD AUTO: 3.65 X10(6)UL
SODIUM SERPL-SCNC: 139 MMOL/L (ref 136–145)
WBC # BLD AUTO: 5.8 X10(3) UL (ref 4–11)

## 2024-11-24 PROCEDURE — 99233 SBSQ HOSP IP/OBS HIGH 50: CPT | Performed by: HOSPITALIST

## 2024-11-24 PROCEDURE — 99233 SBSQ HOSP IP/OBS HIGH 50: CPT | Performed by: INTERNAL MEDICINE

## 2024-11-24 RX ORDER — PHENOBARBITAL 32.4 MG/1
32.4 TABLET ORAL EVERY 8 HOURS SCHEDULED
Status: COMPLETED | OUTPATIENT
Start: 2024-11-25 | End: 2024-11-27

## 2024-11-24 RX ORDER — PHENOBARBITAL 32.4 MG/1
64.8 TABLET ORAL EVERY 8 HOURS
Status: COMPLETED | OUTPATIENT
Start: 2024-11-24 | End: 2024-11-25

## 2024-11-24 RX ORDER — MAGNESIUM OXIDE 400 MG/1
800 TABLET ORAL ONCE
Status: COMPLETED | OUTPATIENT
Start: 2024-11-24 | End: 2024-11-24

## 2024-11-24 NOTE — PHYSICAL THERAPY NOTE
PHYSICAL THERAPY TREATMENT NOTE - INPATIENT    Room Number: 453/453-A     Session: 2     Number of Visits to Meet Established Goals: 10    Presenting Problem: SAH  Co-Morbidities : no pmhx    PHYSICAL THERAPY ASSESSMENT   Patient demonstrates fair progress this session, goals  remain in progress.      Patient is requiring contact guard assist and minimal assist as a result of the following impairments: impaired dynamic standing balance and impaired motor planning.     Patient continues to function below baseline with transfers and gait.  Next session anticipate patient to progress gait and stair negotiation.  Physical Therapy will continue to follow patient for duration of hospitalization.    Patient continues to benefit from continued skilled PT services: for duration of hospitalization, however, given the patient is functioning near baseline level do not anticipate skilled therapy needs at discharge .    PLAN DURING HOSPITALIZATION  Nursing Mobility Recommendation : 1 Assist  PT Device Recommendation: Rolling walker  PT Treatment Plan: Bed mobility;Body mechanics;Coordination;Endurance;Energy conservation;Patient education;Family education;Gait training;Neuromuscular re-educate;Range of motion;Strengthening;Stoop training;Stair training;Transfer training;Balance training  Frequency (Obs): 3-5x/week     CURRENT GOALS   Goal #1 Patient is able to demonstrate supine - sit EOB @ level: supervision   MET 11/20   Goal #2 Patient is able to demonstrate transfers EOB to/from Chair/Wheelchair at assistance level: supervision      Goal #3 Patient is able to ambulate 150 feet with assist device:  LRAD  at assistance level: supervision      Goal #4 Patient will navigate stairs with rail and SBA   Goal #5     Goal #6     Goal Comments: Goals established on 11/19/2024 11/24/2024 all goals ongoing    PHYSICAL THERAPY MEDICAL/SOCIAL HISTORY  History related to current admission: Patient is a 44 year old male admitted on  2024: Presented after being found face down near his apt dx traumatic R frontal SAH, ETOH intoxication, CT with facial bone fx. Restrained -. Transferred to ICU 24 due to worsening withdrawal symptoms and high CIWA scores.     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      OBJECTIVE  Precautions: Bed/chair alarm; needed;Restraints (-150)    WEIGHT BEARING RESTRICTION     PAIN ASSESSMENT   Ratin          BALANCE                                                                                                                       Static Sitting: Good  Dynamic Sitting: Good           Static Standing: Fair -  Dynamic Standing: Poor +    ACTIVITY TOLERANCE                         O2 WALK  Oxygen Therapy  SPO2% on Room Air at Rest: 97    AM-PAC '6-Clicks' INPATIENT SHORT FORM - BASIC MOBILITY  How much difficulty does the patient currently have...  Patient Difficulty: Turning over in bed (including adjusting bedclothes, sheets and blankets)?: None   Patient Difficulty: Sitting down on and standing up from a chair with arms (e.g., wheelchair, bedside commode, etc.): A Little   Patient Difficulty: Moving from lying on back to sitting on the side of the bed?: None   How much help from another person does the patient currently need...   Help from Another: Moving to and from a bed to a chair (including a wheelchair)?: A Little   Help from Another: Need to walk in hospital room?: A Little   Help from Another: Climbing 3-5 steps with a railing?: A Little     AM-PAC Score:  Raw Score:  20   Approx Degree of Impairment: 35.83%   Standardized Score (AM-PAC Scale): 47.67   CMS Modifier (G-Code): CJ    FUNCTIONAL ABILITY STATUS  Gait Assessment   Functional Mobility/Gait Assessment  Gait Assistance: Contact guard assist;Minimum assistance  Distance (ft): 270  Assistive Device: Rolling walker  Pattern: Shuffle (impaired dynamic balance)    Skilled Therapy Provided    Bed Mobility:   Supine<>Sit: mod I   Sit<>Supine: mod I     Transfer Mobility:  Sit<>Stand: SBA    Stand<>Sit: supervision   Gait: Pt ambulated 270' with RW and CGA/min A; pt occasionally lifting one side of walker off floor; required min A to maintain all 4 points of walker on floor despite cuing; also cued for hand position on RW    Therapist's Comments: Pt lying in bed; agreeable to go for a walk with PT. When back in room, pt dropped napkin on floor and then bent down to pick it up and place in garbage with CGA and no UE support. Pt back in bed with RN present at end of session.        Patient End of Session: In bed;With  staff;Needs met;Call light within reach;RN aware of session/findings;All patient questions and concerns addressed;Hospital anti-slip socks    PT Session Time: 14 minutes  Gait Trainin minutes

## 2024-11-24 NOTE — PLAN OF CARE
Received pt confused, on 1L oxygen via NC and CO2 monitor. Pt on four point non-violent restrains and vest for confusion and agitation.  Pt gradually throughout the night pt became more alert and oriented.  Released restrains gradually, leaving jacket only.            Problem: Patient/Family Goals  Goal: Patient/Family Long Term Goal  Description: Patient's Long Term Goal: Discharge with adequate resources     Interventions:  - Assistance from CM/SW if needed  - See additional Care Plan goals for specific interventions  Outcome: Progressing  Goal: Patient/Family Short Term Goal  Description: Patient's Short Term Goal: Remain comfortable    Interventions:   - PRN medications   -Non-pharmacological (Ice packs)  - See additional Care Plan goals for specific interventions  Outcome: Progressing     Problem: METABOLIC/FLUID AND ELECTROLYTES - ADULT  Goal: Electrolytes maintained within normal limits  Description: INTERVENTIONS:  - Monitor labs and rhythm and assess patient for signs and symptoms of electrolyte imbalances  - Administer electrolyte replacement as ordered  - Monitor response to electrolyte replacements, including rhythm and repeat lab results as appropriate  - Fluid restriction as ordered  - Instruct patient on fluid and nutrition restrictions as appropriate  Outcome: Progressing     Problem: HEMATOLOGIC - ADULT  Goal: Free from bleeding injury  Description: (Example usage: patient with low platelets)  INTERVENTIONS:  - Avoid intramuscular injections, enemas and rectal medication administration  - Ensure safe mobilization of patient  - Hold pressure on venipuncture sites to achieve adequate hemostasis  - Assess for signs and symptoms of internal bleeding  - Monitor lab trends  - Patient is to report abnormal signs of bleeding to staff  - Avoid use of toothpicks and dental floss  - Use electric shaver for shaving  - Use soft bristle tooth brush  - Limit straining and forceful nose blowing  Outcome:  Progressing     Problem: DISCHARGE PLANNING  Goal: Discharge to home or other facility with appropriate resources  Description: INTERVENTIONS:  - Identify barriers to discharge w/pt and caregiver  - Include patient/family/discharge partner in discharge planning  - Arrange for needed discharge resources and transportation as appropriate  - Identify discharge learning needs (meds, wound care, etc)  - Arrange for interpreters to assist at discharge as needed  - Consider post-discharge preferences of patient/family/discharge partner  - Complete POLST form as appropriate  - Assess patient's ability to be responsible for managing their own health  - Refer to Case Management Department for coordinating discharge planning if the patient needs post-hospital services based on physician/LIP order or complex needs related to functional status, cognitive ability or social support system  Outcome: Progressing     Problem: Delirium  Goal: Minimize duration of delirium  Description: Interventions:  - Encourage use of hearing aids, eye glasses  - Promote highest level of mobility daily  - Provide frequent reorientation  - Promote wakefulness i.e. lights on, blinds open  - Promote sleep, encourage patient's normal rest cycle i.e. lights off, TV off, minimize noise and interruptions  - Encourage family to assist in orientation and promotion of home routines  Outcome: Progressing     Problem: SAFETY ADULT - FALL  Goal: Free from fall injury  Description: INTERVENTIONS:  - Assess pt frequently for physical needs  - Identify cognitive and physical deficits and behaviors that affect risk of falls.  - [place] fall precautions as indicated by assessment.  - Educate pt/family on patient safety including physical limitations  - Instruct pt to call for assistance with activity based on assessment  - Modify environment to reduce risk of injury  - Provide assistive devices as appropriate  - Consider OT/PT consult to assist with  strengthening/mobility  - Encourage toileting schedule  Outcome: Not Progressing     Problem: Altered Communication/Language Barrier  Goal: Patient/Family is able to understand and participate in their care  Description: Interventions:  - Assess communication ability and preferred communication style  - Implement communication aides and strategies  - Use visual cues when possible  - Listen attentively, be patient, do not interrupt  - Minimize distractions  - Allow time for understanding and response  - Establish method for patient to ask for assistance (call light)  - Provide an  as needed  - Communicate barriers and strategies to overcome with those who interact with patient  Outcome: Not Progressing

## 2024-11-24 NOTE — CONSULTS
Amado Medina Patient Status:  Inpatient    3/22/1980 MRN [id number]   Location [facility] 4SW-A Attending Jes Callejas, DO   Hosp Day # 7 PCP None Pcp     Critical Care Progress Note          Subjective:  No acute events overnight. Much more alert this morning, eating breakfast, following commands.    Objective:    Medications:   magnesium oxide  800 mg Oral Once    thiamine  500 mg Intravenous Q8H ANGELIKA    Followed by    [START ON 2024] thiamine  250 mg Intravenous Daily    Followed by    [START ON 2024] thiamine  100 mg Intravenous Daily    enoxaparin  40 mg Subcutaneous Daily    PHENObarbital  65 mg Intravenous Q8H    Followed by    [START ON 2024] PHENObarbital  32.5 mg Intravenous Q8H    water-oil emulsion   Topical BID    multivitamin  1 tablet Oral Daily    folic acid  1 mg Oral Daily    mupirocin   Topical BID              Intake/Output Summary (Last 24 hours) at 2024 0703  Last data filed at 2024 0400  Gross per 24 hour   Intake --   Output 800 ml   Net -800 ml       /79   Pulse 93   Temp 98.9 °F (37.2 °C) (Temporal)   Resp 23   Ht 154.9 cm (5' 1\")   Wt 151 lb 14.4 oz (68.9 kg)   SpO2 99%   BMI 28.70 kg/m²     Physical Exam:          General Appearance: Alert, cooperative.  Neck: No JVD, neck supple, no adenopathy, trachea midline  Lungs: Clear to auscultation bilaterally, respirations unlabored  Heart: Regular rate and rhythm, S1 and S2 normal, no murmur, rub or gallop  Abdomen: Soft, non-tender, bowel sounds active all four quadrants, no masses, no organomegaly  Extremities: Extremities normal, atraumatic, no cyanosis or edema,capillary refill <3 sec.    Pulses: 2+ and symmetric all extremities  Skin: Skin color, texture, turgor normal for ethnicity, no rashes or lesions, warm and dry    Recent Labs   Lab 24  0538   RBC 3.65*   HGB 10.9*   HCT 33.6*   MCV 92.1   MCH 29.9   MCHC 32.4   RDW 17.6   NEPRELIM 3.78   WBC 5.8   .0     Recent  Labs   Lab 11/22/24  0403 11/22/24  1458 11/23/24  0345 11/24/24  0538   GLU 90  --  68* 157*   BUN 7*  --  6* 7*   CREATSERUM 0.56*  --  0.56* 0.63*   CA 9.0  --  8.9 8.7   ALB 3.8  --  3.5 3.4     --  138 139   K 2.9* 3.3* 3.8  3.8 3.6     --  109 106   CO2 23.0  --  18.0* 25.0   ALKPHO 110  --  98 95   *  --  224* 133*   ALT 78*  --  65* 53*   BILT 0.6  --  0.5 0.4   TP 6.7  --  6.3 6.1     No results for input(s): \"ABGPHT\", \"YDGFPN9V\", \"WTHRY3U\", \"ABGHCO3\", \"ABGBE\", \"TEMP\", \"SIOBHAN\", \"SITE\", \"DEV\", \"THGB\" in the last 168 hours.    Invalid input(s): \"WEW48NFD\", \"CHOB\"  No results for input(s): \"BNP\" in the last 168 hours.  No results for input(s): \"TROP\", \"CK\" in the last 168 hours.    No results found.       Assessment/Plan:    Amado Mclean is a 44 year old male who presented to the ED 11/17 after he was found face down near his apartment who was admitted to the ICU for a traumatic SAH, and ETOH withdrawal. Neurosurgery was consulted for which no interventions were recommended. He was transferred to the general medicine floor and started to go through alcohol withdrawal. He was brought to ICU 11/21 for IV phenobarb load/taper and further management of alcohol withdrawal.     Traumatic right frontal subarachnoid hemorrhage 2/2 to etoh related fall  - INR 0.99, plts 76 on admission s/p 1u plt transfusion- now improved.  - Repeat head CT stable  - Neurosurgery eval with no planned intervention. Now signed off.  - Repeat head CT in 2 weeks  - Neuro critical care signed off     ETOH use disorder  ETOH withdrawal  - Last drink 11/17, ethyl alcohol level 379 on admission  - CIWA protocol, PRN ativan  - Stop IV phenobarb taper, switch to PO taper.  - PRN haldol  - MV/thiamine/folic acid  - Seizure precautions  - High dose thiamine started 11/23 for possible wernicke's encephalopathy.    - Psych liaison to see     Nasal bridge fracture/abrasion 2/2 to fall  - Wound care following  - ENT  consulted  - s/p Augmentin (11/17-11/23)     Hypoglycemia- likely 2/2 to poor PO intake- now resolved.  - s/p hypoglycemia protocol  - Encourage PO intake     Transaminitis- improving  - Likely 2/2 to etoh  - AST//53, tbili, alk phos normal  - Ammonia normal  - Daily CMP     F/E/N  - Replete electrolytes per protocol  - ADAT     Ppx  - subcutaneous lovenox  - SCDs  - PT/OT      Dispo:   - Full code  - Stable to transfer to med/behavioral floor.    Plan of care discussed with intensivist, Dr. Nguyen.      Tracee Sanchez [facility]  Critical Care  [id number]

## 2024-11-24 NOTE — PROGRESS NOTES
[facility]   part of [facility]     Hospitalist Progress Note     Amado Medina Patient Status:  Inpatient    3/22/1980 MRN [id number]   Location [facility] 6NE-A Attending Abi Blackmon MD   Hosp Day # 7 PCP None Pcp     Chief Complaint:I am fine      Subjective:     Patient more appropriate today.  Off Precedex drip    Objective:    Review of Systems:   A comprehensive review of systems was completed; pertinent positive and negatives stated in subjective.    Vital signs:  Temp:  [97.7 °F (36.5 °C)-98.9 °F (37.2 °C)] 97.7 °F (36.5 °C)  Pulse:  [] 104  Resp:  [13-28] 20  BP: ()/(61-81) 114/73  SpO2:  [96 %-100 %] 97 %    Physical Exam:    General: No acute distress  Respiratory: No wheezes, no rhonchi  Cardiovascular: S1, S2, regular rate and rhythm  Abdomen: Soft, Non-tender, non-distended, positive bowel sounds  Neuro: No new focal deficits.   Extremities: No edema      Diagnostic Data:    Labs:  Recent Labs   Lab 24  0443 24  0723 24  0402 24  0345 24  0538   WBC 7.8 9.5 6.7 9.0 5.8   HGB 10.6* 11.3* 11.0* 10.2* 10.9*   MCV 88.3 89.7 89.3 94.8 92.1   PLT 83.0* 197.0 205.0 236.0 319.0       Recent Labs   Lab 24  0403 24  1458 24  0345 24  0538   GLU 90  --  68* 157*   BUN 7*  --  6* 7*   CREATSERUM 0.56*  --  0.56* 0.63*   CA 9.0  --  8.9 8.7   ALB 3.8  --  3.5 3.4     --  138 139   K 2.9* 3.3* 3.8  3.8 3.6     --  109 106   CO2 23.0  --  18.0* 25.0   ALKPHO 110  --  98 95   *  --  224* 133*   ALT 78*  --  65* 53*   BILT 0.6  --  0.5 0.4   TP 6.7  --  6.3 6.1       Estimated Creatinine Clearance: 111.8 mL/min (A) (based on SCr of 0.63 mg/dL (L)).    No results for input(s): \"TROP\", \"TROPHS\", \"CK\" in the last 168 hours.    No results for input(s): \"PTP\", \"INR\" in the last 168 hours.                 Microbiology    No results found for this visit on 24.      Imaging: Reviewed in Epic.    Medications:     PHENobarbital  64.8 mg Oral Q8H    Followed by    [START ON 11/25/2024] PHENobarbital  32.4 mg Oral Q8H ANGELIKA    thiamine  500 mg Intravenous Q8H ANGELIKA    Followed by    [START ON 11/26/2024] thiamine  250 mg Intravenous Daily    Followed by    [START ON 11/29/2024] thiamine  100 mg Intravenous Daily    enoxaparin  40 mg Subcutaneous Daily    water-oil emulsion   Topical BID    multivitamin  1 tablet Oral Daily    folic acid  1 mg Oral Daily    mupirocin   Topical BID       Assessment & Plan:      #s/p fall   #ETOH intoxication   #Suspect Dts  -transfered to icu on 11/21 for worsening withdrawal symptoms  -P.o. phenobarbital, s/p IV phenobarb (11/21-11/24)  s/p high dosages of ativan  -S/p Precedex drip  -Transition to high-dose thiamine (11/24-) for possible Warnicke's encephalopathy, folic acid, multivitamin  -CIWA  -IVF    #Non anion gap metabolic acidosis, improving  -Continue IV fluids for now, will monitor    #Traumatic SAH  -neuro sx eval noted from 11/18, signed off w/ repeat scans/follow up in 2 weeeks     #Thrombocytopenia , due to BM suppression from ETOH most likely  -improving     #Hypokalemia , replace   #Hyponatremia   -replace and monitor     #elevated liver enzymes due to ETOH hepatitis, improving   #nasal bone fracture,  -wound care  -pain control   -?augmentin (11/17, will plan x5 d course)  -ENT to see, recommendations appreciated      Jes Callejas, DO    Supplementary Documentation:     Quality:  DVT Mechanical Prophylaxis:   SCDs,    DVT Pharmacologic Prophylaxis   Medication    enoxaparin (Lovenox) 40 MG/0.4ML SUBQ injection 40 mg                Code Status: Full Code  Alvarado: No urinary catheter in place  Alvarado Duration (in days):   Central line:    DILLON:     Discharge is dependent on: course  At this point Mr. Medina is expected to be discharge to: home    The 21st Century Cures Act makes medical notes like these available to patients in the interest of transparency. Please be advised  this is a medical document. Medical documents are intended to carry relevant information, facts as evident, and the clinical opinion of the practitioner. The medical note is intended as peer to peer communication and may appear blunt or direct. It is written in medical language and may contain abbreviations or verbiage that are unfamiliar.

## 2024-11-24 NOTE — PROGRESS NOTES
Took over pt care this am. Pt resting in bed. Receiving Pheno IVP. Restraints in place. When awake pt restless and confused. Falls back asleep. Weaned off Precedex this am. No s/s of withdrawal. Stopped CIWA. Continue scheduled doses of Phenobarb. No further changes.

## 2024-11-25 LAB
ALBUMIN SERPL-MCNC: 3.6 G/DL (ref 3.2–4.8)
ALBUMIN/GLOB SERPL: 1.1 {RATIO} (ref 1–2)
ALP LIVER SERPL-CCNC: 94 U/L
ALT SERPL-CCNC: 49 U/L
ANION GAP SERPL CALC-SCNC: 8 MMOL/L (ref 0–18)
AST SERPL-CCNC: 107 U/L (ref ?–34)
BILIRUB SERPL-MCNC: 0.4 MG/DL (ref 0.3–1.2)
BUN BLD-MCNC: <5 MG/DL (ref 9–23)
CALCIUM BLD-MCNC: 9.2 MG/DL (ref 8.7–10.4)
CHLORIDE SERPL-SCNC: 105 MMOL/L (ref 98–112)
CO2 SERPL-SCNC: 24 MMOL/L (ref 21–32)
CREAT BLD-MCNC: 0.61 MG/DL
EGFRCR SERPLBLD CKD-EPI 2021: 122 ML/MIN/1.73M2 (ref 60–?)
ERYTHROCYTE [DISTWIDTH] IN BLOOD BY AUTOMATED COUNT: 17.6 %
GLOBULIN PLAS-MCNC: 3.3 G/DL (ref 2–3.5)
GLUCOSE BLD-MCNC: 102 MG/DL (ref 70–99)
HCT VFR BLD AUTO: 32.9 %
HGB BLD-MCNC: 10.7 G/DL
MAGNESIUM SERPL-MCNC: 1.6 MG/DL (ref 1.6–2.6)
MCH RBC QN AUTO: 29 PG (ref 26–34)
MCHC RBC AUTO-ENTMCNC: 32.5 G/DL (ref 31–37)
MCV RBC AUTO: 89.2 FL
PLATELET # BLD AUTO: 399 10(3)UL (ref 150–450)
POTASSIUM SERPL-SCNC: 3.7 MMOL/L (ref 3.5–5.1)
PROT SERPL-MCNC: 6.9 G/DL (ref 5.7–8.2)
RBC # BLD AUTO: 3.69 X10(6)UL
SODIUM SERPL-SCNC: 137 MMOL/L (ref 136–145)
WBC # BLD AUTO: 7.1 X10(3) UL (ref 4–11)

## 2024-11-25 PROCEDURE — 99232 SBSQ HOSP IP/OBS MODERATE 35: CPT | Performed by: HOSPITALIST

## 2024-11-25 RX ORDER — MAGNESIUM OXIDE 400 MG/1
400 TABLET ORAL ONCE
Status: COMPLETED | OUTPATIENT
Start: 2024-11-25 | End: 2024-11-25

## 2024-11-25 NOTE — PLAN OF CARE
Assumed care of patient after report.  used for communication and assessment. Alert and oriented. RA. NSR/ST. Denies pain. Voiding per urinal. Impulsive. Bed alarm on. CTU3 bed available. Report given to BRIAN Cassidy for pt to go to room 3605. Medications and personal belongings sent with patient. See MAR and flowsheets for additional information.

## 2024-11-25 NOTE — PLAN OF CARE
Assumed patient care @ 07:30.  Alert and oriented x 3-4, forgetful at times.  [place] speaking,  used during assessment.  Room air.  NSR on telemetry.  Denies pain.  Neuro check q 4.  Seizure precautions.  Safety fall precautions maintained.  Needs attended, call light within his reach.  Bed in lowest position, bed alarm is on.  Problem: METABOLIC/FLUID AND ELECTROLYTES - ADULT  Goal: Electrolytes maintained within normal limits  Description: INTERVENTIONS:  - Monitor labs and rhythm and assess patient for signs and symptoms of electrolyte imbalances  - Administer electrolyte replacement as ordered  - Monitor response to electrolyte replacements, including rhythm and repeat lab results as appropriate  - Fluid restriction as ordered  - Instruct patient on fluid and nutrition restrictions as appropriate  Outcome: Progressing     Problem: HEMATOLOGIC - ADULT  Goal: Free from bleeding injury  Description: (Example usage: patient with low platelets)  INTERVENTIONS:  - Avoid intramuscular injections, enemas and rectal medication administration  - Ensure safe mobilization of patient  - Hold pressure on venipuncture sites to achieve adequate hemostasis  - Assess for signs and symptoms of internal bleeding  - Monitor lab trends  - Patient is to report abnormal signs of bleeding to staff  - Avoid use of toothpicks and dental floss  - Use electric shaver for shaving  - Use soft bristle tooth brush  - Limit straining and forceful nose blowing  Outcome: Progressing     Problem: SAFETY ADULT - FALL  Goal: Free from fall injury  Description: INTERVENTIONS:  - Assess pt frequently for physical needs  - Identify cognitive and physical deficits and behaviors that affect risk of falls.  - [place] fall precautions as indicated by assessment.  - Educate pt/family on patient safety including physical limitations  - Instruct pt to call for assistance with activity based on assessment  - Modify environment to reduce risk of  injury  - Provide assistive devices as appropriate  - Consider OT/PT consult to assist with strengthening/mobility  - Encourage toileting schedule  Outcome: Progressing     Problem: DISCHARGE PLANNING  Goal: Discharge to home or other facility with appropriate resources  Description: INTERVENTIONS:  - Identify barriers to discharge w/pt and caregiver  - Include patient/family/discharge partner in discharge planning  - Arrange for needed discharge resources and transportation as appropriate  - Identify discharge learning needs (meds, wound care, etc)  - Arrange for interpreters to assist at discharge as needed  - Consider post-discharge preferences of patient/family/discharge partner  - Complete POLST form as appropriate  - Assess patient's ability to be responsible for managing their own health  - Refer to Case Management Department for coordinating discharge planning if the patient needs post-hospital services based on physician/LIP order or complex needs related to functional status, cognitive ability or social support system  Outcome: Progressing     Problem: Altered Communication/Language Barrier  Goal: Patient/Family is able to understand and participate in their care  Description: Interventions:  - Assess communication ability and preferred communication style  - Implement communication aides and strategies  - Use visual cues when possible  - Listen attentively, be patient, do not interrupt  - Minimize distractions  - Allow time for understanding and response  - Establish method for patient to ask for assistance (call light)  - Provide an  as needed  - Communicate barriers and strategies to overcome with those who interact with patient  Outcome: Progressing     Problem: Delirium  Goal: Minimize duration of delirium  Description: Interventions:  - Encourage use of hearing aids, eye glasses  - Promote highest level of mobility daily  - Provide frequent reorientation  - Promote wakefulness i.e. lights  on, blinds open  - Promote sleep, encourage patient's normal rest cycle i.e. lights off, TV off, minimize noise and interruptions  - Encourage family to assist in orientation and promotion of home routines  Outcome: Progressing     Problem: Patient/Family Goals  Goal: Patient/Family Long Term Goal  Description: Patient's Long Term Goal: Discharge with adequate resources     Interventions:  - Assistance from CM/SW if needed  - See additional Care Plan goals for specific interventions  Outcome: Progressing  Goal: Patient/Family Short Term Goal  Description: Patient's Short Term Goal: Remain comfortable    Interventions:   - PRN medications   -Non-pharmacological (Ice packs)  - See additional Care Plan goals for specific interventions  Outcome: Progressing

## 2024-11-25 NOTE — PLAN OF CARE
Received p t as a transfer to unit around 2145  AxOx3, can be confused & impulsive at times  RA  NSR/ST  Denies pain  VSS  Neuro checks q4  Denies nausea  Seizure precautions  Aspiration precautions  Up contact guard assist  Regular diet  LFA PIV saline locked  Uses urinal to void  Bed in lowest position  Bed alarm on  Call light within reach  Nees met at this time    Problem: Patient/Family Goals  Goal: Patient/Family Long Term Goal  Description: Patient's Long Term Goal: Discharge with adequate resources     Interventions:  - Assistance from CM/SW if needed  - See additional Care Plan goals for specific interventions  Outcome: Progressing  Goal: Patient/Family Short Term Goal  Description: Patient's Short Term Goal: Remain comfortable    Interventions:   - PRN medications   -Non-pharmacological (Ice packs)  - See additional Care Plan goals for specific interventions  Outcome: Progressing     Problem: METABOLIC/FLUID AND ELECTROLYTES - ADULT  Goal: Electrolytes maintained within normal limits  Description: INTERVENTIONS:  - Monitor labs and rhythm and assess patient for signs and symptoms of electrolyte imbalances  - Administer electrolyte replacement as ordered  - Monitor response to electrolyte replacements, including rhythm and repeat lab results as appropriate  - Fluid restriction as ordered  - Instruct patient on fluid and nutrition restrictions as appropriate  Outcome: Progressing     Problem: HEMATOLOGIC - ADULT  Goal: Free from bleeding injury  Description: (Example usage: patient with low platelets)  INTERVENTIONS:  - Avoid intramuscular injections, enemas and rectal medication administration  - Ensure safe mobilization of patient  - Hold pressure on venipuncture sites to achieve adequate hemostasis  - Assess for signs and symptoms of internal bleeding  - Monitor lab trends  - Patient is to report abnormal signs of bleeding to staff  - Avoid use of toothpicks and dental floss  - Use electric shaver for  shaving  - Use soft bristle tooth brush  - Limit straining and forceful nose blowing  Outcome: Progressing     Problem: SAFETY ADULT - FALL  Goal: Free from fall injury  Description: INTERVENTIONS:  - Assess pt frequently for physical needs  - Identify cognitive and physical deficits and behaviors that affect risk of falls.  - [place] fall precautions as indicated by assessment.  - Educate pt/family on patient safety including physical limitations  - Instruct pt to call for assistance with activity based on assessment  - Modify environment to reduce risk of injury  - Provide assistive devices as appropriate  - Consider OT/PT consult to assist with strengthening/mobility  - Encourage toileting schedule  Outcome: Progressing     Problem: DISCHARGE PLANNING  Goal: Discharge to home or other facility with appropriate resources  Description: INTERVENTIONS:  - Identify barriers to discharge w/pt and caregiver  - Include patient/family/discharge partner in discharge planning  - Arrange for needed discharge resources and transportation as appropriate  - Identify discharge learning needs (meds, wound care, etc)  - Arrange for interpreters to assist at discharge as needed  - Consider post-discharge preferences of patient/family/discharge partner  - Complete POLST form as appropriate  - Assess patient's ability to be responsible for managing their own health  - Refer to Case Management Department for coordinating discharge planning if the patient needs post-hospital services based on physician/LIP order or complex needs related to functional status, cognitive ability or social support system  Outcome: Progressing     Problem: Altered Communication/Language Barrier  Goal: Patient/Family is able to understand and participate in their care  Description: Interventions:  - Assess communication ability and preferred communication style  - Implement communication aides and strategies  - Use visual cues when possible  - Listen  attentively, be patient, do not interrupt  - Minimize distractions  - Allow time for understanding and response  - Establish method for patient to ask for assistance (call light)  - Provide an  as needed  - Communicate barriers and strategies to overcome with those who interact with patient  Outcome: Progressing     Problem: Delirium  Goal: Minimize duration of delirium  Description: Interventions:  - Encourage use of hearing aids, eye glasses  - Promote highest level of mobility daily  - Provide frequent reorientation  - Promote wakefulness i.e. lights on, blinds open  - Promote sleep, encourage patient's normal rest cycle i.e. lights off, TV off, minimize noise and interruptions  - Encourage family to assist in orientation and promotion of home routines  Outcome: Progressing

## 2024-11-25 NOTE — PROGRESS NOTES
[facility]   part of [facility]     Hospitalist Progress Note     Amado Medina Patient Status:  Inpatient    3/22/1980 MRN [id number]   Location [facility] 6NE-A Attending Abi Blackmon MD   Hosp Day # 8 PCP None Pcp     Chief Complaint:I am fine      Subjective:     Patient's mentation continues to improve. No longer requiring restrains. Appears appropriate.     Objective:    Review of Systems:   A comprehensive review of systems was completed; pertinent positive and negatives stated in subjective.    Vital signs:  Temp:  [97.7 °F (36.5 °C)-99.6 °F (37.6 °C)] 98.8 °F (37.1 °C)  Pulse:  [] 82  Resp:  [16-26] 16  BP: (105-127)/(70-90) 127/89  SpO2:  [95 %-99 %] 98 %    Physical Exam:    General: No acute distress  Respiratory: No wheezes, no rhonchi  Cardiovascular: S1, S2, regular rate and rhythm  Abdomen: Soft, Non-tender, non-distended, positive bowel sounds  Neuro: No new focal deficits.   Extremities: No edema      Diagnostic Data:    Labs:  Recent Labs   Lab 24  0723 24  0402 24  0345 24  0538 24  0633   WBC 9.5 6.7 9.0 5.8 7.1   HGB 11.3* 11.0* 10.2* 10.9* 10.7*   MCV 89.7 89.3 94.8 92.1 89.2   .0 205.0 236.0 319.0 399.0       Recent Labs   Lab 24  0345 24  0538 24  0633   GLU 68* 157* 102*   BUN 6* 7* <5*   CREATSERUM 0.56* 0.63* 0.61*   CA 8.9 8.7 9.2   ALB 3.5 3.4 3.6    139 137   K 3.8  3.8 3.6 3.7    106 105   CO2 18.0* 25.0 24.0   ALKPHO 98 95 94   * 133* 107*   ALT 65* 53* 49   BILT 0.5 0.4 0.4   TP 6.3 6.1 6.9       Estimated Creatinine Clearance: 115.5 mL/min (A) (based on SCr of 0.61 mg/dL (L)).    No results for input(s): \"TROP\", \"TROPHS\", \"CK\" in the last 168 hours.    No results for input(s): \"PTP\", \"INR\" in the last 168 hours.                 Microbiology    No results found for this visit on 24.      Imaging: Reviewed in Epic.    Medications:    PHENobarbital  64.8 mg Oral Q8H     Followed by    PHENobarbital  32.4 mg Oral Q8H ANGELIKA    thiamine  500 mg Intravenous Q8H ANGELIKA    Followed by    [START ON 11/26/2024] thiamine  250 mg Intravenous Daily    Followed by    [START ON 11/29/2024] thiamine  100 mg Intravenous Daily    enoxaparin  40 mg Subcutaneous Daily    water-oil emulsion   Topical BID    multivitamin  1 tablet Oral Daily    folic acid  1 mg Oral Daily    mupirocin   Topical BID       Assessment & Plan:      #s/p fall   #ETOH intoxication   #Suspect Dts  -transfered to icu on 11/21 for worsening withdrawal symptoms  -cont P.o. phenobarbital taper course, s/p IV phenobarb (11/21-11/24)  s/p high dosages of ativan  -S/p Precedex drip  -cont high-dose thiamine (11/24-) for possible Warnicke's encephalopathy, folic acid, multivitamin  -CIWA  -IVF    #Non anion gap metabolic acidosis, improving  -s/p ivf    #Traumatic SAH  -neuro sx eval noted from 11/18, signed off w/ repeat scans/follow up in 2 weeeks     #Thrombocytopenia , due to BM suppression from ETOH most likely  -improving     #Hypokalemia , replace   #Hyponatremia   -replace and monitor     #elevated liver enzymes due to ETOH hepatitis, improving   #nasal bone fracture,  -wound care  -pain control   -?augmentin (11/17, will plan x5 d course)  -ENT to see, recommendations appreciated      Jes Callejas, DO    Supplementary Documentation:     Quality:  DVT Mechanical Prophylaxis:   SCDs,    DVT Pharmacologic Prophylaxis   Medication    enoxaparin (Lovenox) 40 MG/0.4ML SUBQ injection 40 mg                Code Status: Full Code  Alvarado: No urinary catheter in place  Alvarado Duration (in days):   Central line:    DILLON:     Discharge is dependent on: course  At this point Mr. Medina is expected to be discharge to: home    The 21st Century Cures Act makes medical notes like these available to patients in the interest of transparency. Please be advised this is a medical document. Medical documents are intended to carry relevant  information, facts as evident, and the clinical opinion of the practitioner. The medical note is intended as peer to peer communication and may appear blunt or direct. It is written in medical language and may contain abbreviations or verbiage that are unfamiliar.

## 2024-11-25 NOTE — BH PROGRESS NOTE
Writer met with Pt regarding an elevated CIWA score. On 11/17/24, Pt's admission date, Pt's BAL was 379 at 12:24 am. Pt alert and oriented x3. Pt denies SI/HI/AVH. Pt does not appear to be responding to internal stimuli. Pt states he \"wants to go back to work.\" Pt asked this writer multiple times when he can leave. Writer explained that it is up to the doctor. Pt does not seem to understand the severity of his medical admission. Writer discussed alcohol abuse treatment resources with Pt. Pt reports that \"it is my choice if I want to stop or not.\" Later Pt requests AA resources. This writer will provide local AA groups in discharge summary.     Interpretor service used via telephone: [phone] (Mejia)

## 2024-11-26 LAB
ALBUMIN SERPL-MCNC: 3.6 G/DL (ref 3.2–4.8)
ALBUMIN/GLOB SERPL: 1 {RATIO} (ref 1–2)
ALP LIVER SERPL-CCNC: 87 U/L
ALT SERPL-CCNC: 44 U/L
ANION GAP SERPL CALC-SCNC: 6 MMOL/L (ref 0–18)
AST SERPL-CCNC: 80 U/L (ref ?–34)
BILIRUB SERPL-MCNC: 0.4 MG/DL (ref 0.3–1.2)
BUN BLD-MCNC: <5 MG/DL (ref 9–23)
CALCIUM BLD-MCNC: 9.3 MG/DL (ref 8.7–10.4)
CHLORIDE SERPL-SCNC: 104 MMOL/L (ref 98–112)
CO2 SERPL-SCNC: 26 MMOL/L (ref 21–32)
CREAT BLD-MCNC: 0.66 MG/DL
EGFRCR SERPLBLD CKD-EPI 2021: 119 ML/MIN/1.73M2 (ref 60–?)
ERYTHROCYTE [DISTWIDTH] IN BLOOD BY AUTOMATED COUNT: 17.6 %
GLOBULIN PLAS-MCNC: 3.5 G/DL (ref 2–3.5)
GLUCOSE BLD-MCNC: 95 MG/DL (ref 70–99)
HCT VFR BLD AUTO: 33.2 %
HGB BLD-MCNC: 10.8 G/DL
MAGNESIUM SERPL-MCNC: 1.7 MG/DL (ref 1.6–2.6)
MCH RBC QN AUTO: 29.2 PG (ref 26–34)
MCHC RBC AUTO-ENTMCNC: 32.5 G/DL (ref 31–37)
MCV RBC AUTO: 89.7 FL
PLATELET # BLD AUTO: 408 10(3)UL (ref 150–450)
POTASSIUM SERPL-SCNC: 3.9 MMOL/L (ref 3.5–5.1)
PROT SERPL-MCNC: 7.1 G/DL (ref 5.7–8.2)
RBC # BLD AUTO: 3.7 X10(6)UL
SODIUM SERPL-SCNC: 136 MMOL/L (ref 136–145)
WBC # BLD AUTO: 5.5 X10(3) UL (ref 4–11)

## 2024-11-26 PROCEDURE — 99232 SBSQ HOSP IP/OBS MODERATE 35: CPT | Performed by: HOSPITALIST

## 2024-11-26 RX ORDER — MAGNESIUM OXIDE 400 MG/1
400 TABLET ORAL ONCE
Status: COMPLETED | OUTPATIENT
Start: 2024-11-26 | End: 2024-11-26

## 2024-11-26 NOTE — PLAN OF CARE
Assumed patient care at 1930  Pt oriented x 3, forgetful   [place] speaking  assistance   Sinus/ST on tele   Vitals stable, room air   Denies having any pain   Neuro checks Q 4   Safety and seizure precautions in place   Ambulatory x 1 assist SB   Needs met at this time  Will continue poc      Problem: Patient/Family Goals  Goal: Patient/Family Long Term Goal  Description: Patient's Long Term Goal: Discharge with adequate resources     Interventions:  - Assistance from CM/SW if needed  - See additional Care Plan goals for specific interventions  Outcome: Progressing  Goal: Patient/Family Short Term Goal  Description: Patient's Short Term Goal: Remain comfortable    Interventions:   - PRN medications   -Non-pharmacological (Ice packs)  - See additional Care Plan goals for specific interventions  Outcome: Progressing     Problem: METABOLIC/FLUID AND ELECTROLYTES - ADULT  Goal: Electrolytes maintained within normal limits  Description: INTERVENTIONS:  - Monitor labs and rhythm and assess patient for signs and symptoms of electrolyte imbalances  - Administer electrolyte replacement as ordered  - Monitor response to electrolyte replacements, including rhythm and repeat lab results as appropriate  - Fluid restriction as ordered  - Instruct patient on fluid and nutrition restrictions as appropriate  Outcome: Progressing     Problem: HEMATOLOGIC - ADULT  Goal: Free from bleeding injury  Description: (Example usage: patient with low platelets)  INTERVENTIONS:  - Avoid intramuscular injections, enemas and rectal medication administration  - Ensure safe mobilization of patient  - Hold pressure on venipuncture sites to achieve adequate hemostasis  - Assess for signs and symptoms of internal bleeding  - Monitor lab trends  - Patient is to report abnormal signs of bleeding to staff  - Avoid use of toothpicks and dental floss  - Use electric shaver for shaving  - Use soft bristle tooth brush  - Limit straining and  forceful nose blowing  Outcome: Progressing     Problem: SAFETY ADULT - FALL  Goal: Free from fall injury  Description: INTERVENTIONS:  - Assess pt frequently for physical needs  - Identify cognitive and physical deficits and behaviors that affect risk of falls.  - [place] fall precautions as indicated by assessment.  - Educate pt/family on patient safety including physical limitations  - Instruct pt to call for assistance with activity based on assessment  - Modify environment to reduce risk of injury  - Provide assistive devices as appropriate  - Consider OT/PT consult to assist with strengthening/mobility  - Encourage toileting schedule  Outcome: Progressing     Problem: DISCHARGE PLANNING  Goal: Discharge to home or other facility with appropriate resources  Description: INTERVENTIONS:  - Identify barriers to discharge w/pt and caregiver  - Include patient/family/discharge partner in discharge planning  - Arrange for needed discharge resources and transportation as appropriate  - Identify discharge learning needs (meds, wound care, etc)  - Arrange for interpreters to assist at discharge as needed  - Consider post-discharge preferences of patient/family/discharge partner  - Complete POLST form as appropriate  - Assess patient's ability to be responsible for managing their own health  - Refer to Case Management Department for coordinating discharge planning if the patient needs post-hospital services based on physician/LIP order or complex needs related to functional status, cognitive ability or social support system  Outcome: Progressing     Problem: Altered Communication/Language Barrier  Goal: Patient/Family is able to understand and participate in their care  Description: Interventions:  - Assess communication ability and preferred communication style  - Implement communication aides and strategies  - Use visual cues when possible  - Listen attentively, be patient, do not interrupt  - Minimize  distractions  - Allow time for understanding and response  - Establish method for patient to ask for assistance (call light)  - Provide an  as needed  - Communicate barriers and strategies to overcome with those who interact with patient  Outcome: Progressing     Problem: Delirium  Goal: Minimize duration of delirium  Description: Interventions:  - Encourage use of hearing aids, eye glasses  - Promote highest level of mobility daily  - Provide frequent reorientation  - Promote wakefulness i.e. lights on, blinds open  - Promote sleep, encourage patient's normal rest cycle i.e. lights off, TV off, minimize noise and interruptions  - Encourage family to assist in orientation and promotion of home routines  Outcome: Progressing

## 2024-11-26 NOTE — SLP NOTE
SPEECH DAILY NOTE - INPATIENT    ASSESSMENT & PLAN   ASSESSMENT  Pt seen for dysphagia tx to assess tolerance with recommended diet, ensure proper utilization of aspiration precautions and provide pt/family education.  Patient received alert in bed. Language Line  utilized throughout visit. Patient reported good tolerance of PO intake. Mastication and AP bolus transit were thorough and efficient without evidence of oral residue. No overt s/s of aspiration observed and patient denied odynophagia and globus sensation across consistencies. Recommend patient continue a regular diet and thin liquids. No further dysphagia follow up warranted at this time. SLP discussed cognitive-linguistic evaluation, but patient not interested at this time. He feels cognition is intact and is solely focused on being discharged this morning. Given patient refusal for cognitive evaluation, SLP will sign off.    Diet Recommendations - Solids: Regular  Diet Recommendations - Liquids: Thin Liquids    Compensatory Strategies Recommended: Slow rate;Small bites and sips  Aspiration Precautions: Upright position;Slow rate;Small bites and sips  Medication Administration Recommendations:  (as tolerated)    Patient Experiencing Pain: No     Pain Location: teeth     Nursing Aware of Pain?: Yes    Treatment Plan  Treatment Plan/Recommendations: No further inpatient SLP service warranted    Interdisciplinary Communication: Discussed with RN            GOALS  Goal #1 The patient will tolerate regular consistency and thin liquids without overt signs or symptoms of aspiration with 100 % accuracy over 1-2 session(s).  Met   Goal #2 The patient/family/caregiver will demonstrate understanding and implementation of aspiration precautions and swallow strategies independently over 1-2 session(s).     Met   Goal #3 Patient will participate in communication assessment as clinically appropriate  Discharge - Pt Refused        FOLLOW UP  Follow Up Needed  (Documentation Required): No  SLP Follow-up Date: 11/26/24  Number of Visits to Meet Established Goals: 2    Session: 2    If you have any questions, please contact ADELINE [place]

## 2024-11-26 NOTE — PROGRESS NOTES
[facility]   part of [facility]     Hospitalist Progress Note     Amado Medina Patient Status:  Inpatient    3/22/1980 MRN [id number]   Location [facility] 6NE-A Attending Abi Blackmon MD   Hosp Day # 9 PCP None Pcp     Chief Complaint:I am fine      Subjective:     No acute events reported. Poor memory      Objective:    Review of Systems:   A comprehensive review of systems was completed; pertinent positive and negatives stated in subjective.    Vital signs:  Temp:  [97.8 °F (36.6 °C)-98.8 °F (37.1 °C)] 98 °F (36.7 °C)  Pulse:  [73-94] 73  Resp:  [16-18] 18  BP: (100-127)/(48-89) 104/73  SpO2:  [91 %-99 %] 96 %    Physical Exam:    General: No acute distress  Respiratory: No wheezes, no rhonchi  Cardiovascular: S1, S2, regular rate and rhythm  Abdomen: Soft, Non-tender, non-distended, positive bowel sounds  Neuro: No new focal deficits.   Extremities: No edema      Diagnostic Data:    Labs:  Recent Labs   Lab 24  0402 24  0345 24  0538 24  0633 24  0617   WBC 6.7 9.0 5.8 7.1 5.5   HGB 11.0* 10.2* 10.9* 10.7* 10.8*   MCV 89.3 94.8 92.1 89.2 89.7   .0 236.0 319.0 399.0 408.0       Recent Labs   Lab 24  0538 24  0633 24  0617   * 102* 95   BUN 7* <5* <5*   CREATSERUM 0.63* 0.61* 0.66*   CA 8.7 9.2 9.3   ALB 3.4 3.6 3.6    137 136   K 3.6 3.7 3.9    105 104   CO2 25.0 24.0 26.0   ALKPHO 95 94 87   * 107* 80*   ALT 53* 49 44   BILT 0.4 0.4 0.4   TP 6.1 6.9 7.1       Estimated Creatinine Clearance: 106.8 mL/min (A) (based on SCr of 0.66 mg/dL (L)).    No results for input(s): \"TROP\", \"TROPHS\", \"CK\" in the last 168 hours.    No results for input(s): \"PTP\", \"INR\" in the last 168 hours.                 Microbiology    No results found for this visit on 24.      Imaging: Reviewed in Epic.    Medications:    PHENobarbital  32.4 mg Oral Q8H ANGELIKA    thiamine  250 mg Intravenous Daily    Followed by    [START ON  11/29/2024] thiamine  100 mg Intravenous Daily    enoxaparin  40 mg Subcutaneous Daily    water-oil emulsion   Topical BID    multivitamin  1 tablet Oral Daily    folic acid  1 mg Oral Daily    mupirocin   Topical BID       Assessment & Plan:      #s/p fall   #ETOH intoxication   #Suspect Dts  -transfered to icu on 11/21 for worsening withdrawal symptoms  -taper dose phenobarb complete on Friday; cont P.o. phenobarbital taper course, s/p IV phenobarb (11/21-11/24)  s/p high dosages of ativan  -S/p Precedex drip  -cont high-dose thiamine (11/24-) for possible Warnicke's encephalopathy, folic acid, multivitamin  -CIWA  -s/p IVF    #Non anion gap metabolic acidosis, improving  -s/p ivf    #Traumatic SAH  -neuro sx eval noted from 11/18, signed off w/ repeat scans/follow up in 2 weeeks     #Thrombocytopenia , due to BM suppression from ETOH most likely  -improving     #Hypokalemia , replace   #Hyponatremia   -replace and monitor     #elevated liver enzymes due to ETOH hepatitis, improving   #nasal bone fracture,  -wound care  -pain control   -?augmentin (11/17, will plan x5 d course)  -ENT to see, recommendations appreciated      Jes Callejas, DO    Supplementary Documentation:     Quality:  DVT Mechanical Prophylaxis:   SCDs,    DVT Pharmacologic Prophylaxis   Medication    enoxaparin (Lovenox) 40 MG/0.4ML SUBQ injection 40 mg                Code Status: Full Code  Alvarado: No urinary catheter in place  Alvarado Duration (in days):   Central line:    DILLON:     Discharge is dependent on: course  At this point Mr. Medina is expected to be discharge to: home    The 21st Century Cures Act makes medical notes like these available to patients in the interest of transparency. Please be advised this is a medical document. Medical documents are intended to carry relevant information, facts as evident, and the clinical opinion of the practitioner. The medical note is intended as peer to peer communication and may appear blunt  or direct. It is written in medical language and may contain abbreviations or verbiage that are unfamiliar.

## 2024-11-26 NOTE — PHYSICAL THERAPY NOTE
PHYSICAL THERAPY TREATMENT NOTE - INPATIENT    Room Number: 3605/3605-A     Session: 3     Number of Visits to Meet Established Goals: 10    Presenting Problem: SAH  Co-Morbidities : no pmhx    PHYSICAL THERAPY MEDICAL/SOCIAL HISTORY  History related to current admission: Patient is a 44 year old male admitted on 11/17/2024: Presented after being found face down near his apt dx traumatic R frontal SAH, ETOH intoxication, CT with facial bone fx. Restrained 11/18-11/19. Transferred to ICU 11/21/24 due to worsening withdrawal symptoms and high CIWA scores.     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      Patient is requiring independent as a result, at this time pt is at baseline and does not have PT needs. PT will discharge off caseload.         Patient continues to benefit from continued skilled PT services: for duration of hospitalization, however, given the patient is functioning near baseline level do not anticipate skilled therapy needs at discharge .    PLAN DURING HOSPITALIZATION  Nursing Mobility Recommendation : 1 Assist  PT Device Recommendation: Rolling walker  PT Treatment Plan: Bed mobility;Body mechanics;Coordination;Endurance;Energy conservation;Patient education;Family education;Gait training;Neuromuscular re-educate;Range of motion;Strengthening;Stoop training;Stair training;Transfer training;Balance training  Frequency (Obs): 3-5x/week     CURRENT GOALS     Goal #1 Patient is able to demonstrate supine - sit EOB @ level: supervision   MET     Goal #2 Patient is able to demonstrate transfers EOB to/from Chair/Wheelchair at assistance level: supervision      Goal #3 Patient is able to ambulate 150 feet with assist device:  LRAD  at assistance level: supervision      Goal #4 Patient will navigate stairs with rail and SBA   Goal #5     Goal #6     Goal Comments: Goals established on 2024 all goals ongoing    SUBJECTIVE  \"I feel better\"    OBJECTIVE  Precautions: Bed/chair alarm; needed;Restraints (-150)    WEIGHT BEARING RESTRICTION     PAIN ASSESSMENT   Ratin  Location: Pt reports no pain       BALANCE                                                                                                                       Static Sitting: Good  Dynamic Sitting: Good           Static Standing: Fair +  Dynamic Standing: Fair +    ACTIVITY TOLERANCE                         O2 WALK       AM-PAC '6-Clicks' INPATIENT SHORT FORM - BASIC MOBILITY  How much difficulty does the patient currently have...  Patient Difficulty: Turning over in bed (including adjusting bedclothes, sheets and blankets)?: None   Patient Difficulty: Sitting down on and standing up from a chair with arms (e.g., wheelchair, bedside commode, etc.): None   Patient Difficulty: Moving from lying on back to sitting on the side of the bed?: None   How much help from another person does the patient currently need...   Help from Another: Moving to and from a bed to a chair (including a wheelchair)?: None   Help from Another: Need to walk in hospital room?: None   Help from Another: Climbing 3-5 steps with a railing?: None     AM-PAC Score:  Raw Score: 24   Approx Degree of Impairment: 0%   Standardized Score (AM-PAC Scale): 61.14   CMS Modifier (G-Code): CH    FUNCTIONAL ABILITY STATUS  Gait Assessment   Functional Mobility/Gait Assessment  Gait Assistance: Independent  Distance (ft): 300  Assistive Device: None  Pattern: Within Functional Limits    Skilled  Therapy Provided    Bed Mobility:  Rolling: NT   Supine<>Sit: IND   Sit<>Supine: IND     Transfer Mobility:  Sit<>Stand: IND   Stand<>Sit: IND   Gait: IND 300ft no assisted device    Therapist's Comments: Pt was observed with no LOB when ambulating.      Patient End of Session: Needs met;Call light within reach;Up in chair;RN aware of session/findings;All patient questions and concerns addressed    PT Session Time: 15 minutes  Gait Training: 15 minutes

## 2024-11-26 NOTE — PLAN OF CARE
Problem: Patient/Family Goals  Goal: Patient/Family Long Term Goal  Description: Patient's Long Term Goal: Discharge with adequate resources     Interventions:  - Assistance from CM/SW if needed  - See additional Care Plan goals for specific interventions  Outcome: Progressing  Goal: Patient/Family Short Term Goal  Description: Patient's Short Term Goal: Remain comfortable    Interventions:   - PRN medications   -Non-pharmacological (Ice packs)  - See additional Care Plan goals for specific interventions  Outcome: Progressing     Problem: METABOLIC/FLUID AND ELECTROLYTES - ADULT  Goal: Electrolytes maintained within normal limits  Description: INTERVENTIONS:  - Monitor labs and rhythm and assess patient for signs and symptoms of electrolyte imbalances  - Administer electrolyte replacement as ordered  - Monitor response to electrolyte replacements, including rhythm and repeat lab results as appropriate  - Fluid restriction as ordered  - Instruct patient on fluid and nutrition restrictions as appropriate  Outcome: Progressing     Problem: HEMATOLOGIC - ADULT  Goal: Free from bleeding injury  Description: (Example usage: patient with low platelets)  INTERVENTIONS:  - Avoid intramuscular injections, enemas and rectal medication administration  - Ensure safe mobilization of patient  - Hold pressure on venipuncture sites to achieve adequate hemostasis  - Assess for signs and symptoms of internal bleeding  - Monitor lab trends  - Patient is to report abnormal signs of bleeding to staff  - Avoid use of toothpicks and dental floss  - Use electric shaver for shaving  - Use soft bristle tooth brush  - Limit straining and forceful nose blowing  Outcome: Progressing     Problem: SAFETY ADULT - FALL  Goal: Free from fall injury  Description: INTERVENTIONS:  - Assess pt frequently for physical needs  - Identify cognitive and physical deficits and behaviors that affect risk of falls.  - [place] fall precautions as indicated by  assessment.  - Educate pt/family on patient safety including physical limitations  - Instruct pt to call for assistance with activity based on assessment  - Modify environment to reduce risk of injury  - Provide assistive devices as appropriate  - Consider OT/PT consult to assist with strengthening/mobility  - Encourage toileting schedule  Outcome: Progressing     Problem: DISCHARGE PLANNING  Goal: Discharge to home or other facility with appropriate resources  Description: INTERVENTIONS:  - Identify barriers to discharge w/pt and caregiver  - Include patient/family/discharge partner in discharge planning  - Arrange for needed discharge resources and transportation as appropriate  - Identify discharge learning needs (meds, wound care, etc)  - Arrange for interpreters to assist at discharge as needed  - Consider post-discharge preferences of patient/family/discharge partner  - Complete POLST form as appropriate  - Assess patient's ability to be responsible for managing their own health  - Refer to Case Management Department for coordinating discharge planning if the patient needs post-hospital services based on physician/LIP order or complex needs related to functional status, cognitive ability or social support system  Outcome: Progressing     Problem: Altered Communication/Language Barrier  Goal: Patient/Family is able to understand and participate in their care  Description: Interventions:  - Assess communication ability and preferred communication style  - Implement communication aides and strategies  - Use visual cues when possible  - Listen attentively, be patient, do not interrupt  - Minimize distractions  - Allow time for understanding and response  - Establish method for patient to ask for assistance (call light)  - Provide an  as needed  - Communicate barriers and strategies to overcome with those who interact with patient  Outcome: Progressing     Problem: Delirium  Goal: Minimize duration of  delirium  Description: Interventions:  - Encourage use of hearing aids, eye glasses  - Promote highest level of mobility daily  - Provide frequent reorientation  - Promote wakefulness i.e. lights on, blinds open  - Promote sleep, encourage patient's normal rest cycle i.e. lights off, TV off, minimize noise and interruptions  - Encourage family to assist in orientation and promotion of home routines  Outcome: Progressing

## 2024-11-26 NOTE — PLAN OF CARE
Assumed patient care @ 7:30.  Alert and oriented x 3-4.  [place] speaking.  Room air.  NSR on telemetry.  Seizure precautions maintained.  Safety fall precautions maintained.  Needs attended, call light within his reach.  Bed in lowest position, bed alarm is on.  Problem: Patient/Family Goals  Goal: Patient/Family Long Term Goal  Description: Patient's Long Term Goal: Discharge with adequate resources     Interventions:  - Assistance from CM/SW if needed  - See additional Care Plan goals for specific interventions  11/26/2024 1146 by Salma Jenkins RN  Outcome: Progressing  11/26/2024 1146 by Salma Jenkins RN  Outcome: Progressing  Goal: Patient/Family Short Term Goal  Description: Patient's Short Term Goal: Remain comfortable    Interventions:   - PRN medications   -Non-pharmacological (Ice packs)  - See additional Care Plan goals for specific interventions  11/26/2024 1146 by Salma Jenkins RN  Outcome: Progressing  11/26/2024 1146 by Salma Jenkins RN  Outcome: Progressing     Problem: METABOLIC/FLUID AND ELECTROLYTES - ADULT  Goal: Electrolytes maintained within normal limits  Description: INTERVENTIONS:  - Monitor labs and rhythm and assess patient for signs and symptoms of electrolyte imbalances  - Administer electrolyte replacement as ordered  - Monitor response to electrolyte replacements, including rhythm and repeat lab results as appropriate  - Fluid restriction as ordered  - Instruct patient on fluid and nutrition restrictions as appropriate  11/26/2024 1146 by Salma Jenkins RN  Outcome: Progressing  11/26/2024 1146 by Salma Jenkins RN  Outcome: Progressing     Problem: HEMATOLOGIC - ADULT  Goal: Free from bleeding injury  Description: (Example usage: patient with low platelets)  INTERVENTIONS:  - Avoid intramuscular injections, enemas and rectal medication administration  - Ensure safe mobilization of patient  - Hold pressure on venipuncture sites to achieve adequate  hemostasis  - Assess for signs and symptoms of internal bleeding  - Monitor lab trends  - Patient is to report abnormal signs of bleeding to staff  - Avoid use of toothpicks and dental floss  - Use electric shaver for shaving  - Use soft bristle tooth brush  - Limit straining and forceful nose blowing  11/26/2024 1146 by Salma Jenkins RN  Outcome: Progressing  11/26/2024 1146 by Salma Jenkins RN  Outcome: Progressing     Problem: SAFETY ADULT - FALL  Goal: Free from fall injury  Description: INTERVENTIONS:  - Assess pt frequently for physical needs  - Identify cognitive and physical deficits and behaviors that affect risk of falls.  - [place] fall precautions as indicated by assessment.  - Educate pt/family on patient safety including physical limitations  - Instruct pt to call for assistance with activity based on assessment  - Modify environment to reduce risk of injury  - Provide assistive devices as appropriate  - Consider OT/PT consult to assist with strengthening/mobility  - Encourage toileting schedule  11/26/2024 1146 by Salma Jenkins RN  Outcome: Progressing  11/26/2024 1146 by Salma Jenkins RN  Outcome: Progressing     Problem: Altered Communication/Language Barrier  Goal: Patient/Family is able to understand and participate in their care  Description: Interventions:  - Assess communication ability and preferred communication style  - Implement communication aides and strategies  - Use visual cues when possible  - Listen attentively, be patient, do not interrupt  - Minimize distractions  - Allow time for understanding and response  - Establish method for patient to ask for assistance (call light)  - Provide an  as needed  - Communicate barriers and strategies to overcome with those who interact with patient  11/26/2024 1146 by Salma Jenkins RN  Outcome: Progressing  11/26/2024 1146 by Salma Jenkins RN  Outcome: Progressing     Problem: Delirium  Goal:  Minimize duration of delirium  Description: Interventions:  - Encourage use of hearing aids, eye glasses  - Promote highest level of mobility daily  - Provide frequent reorientation  - Promote wakefulness i.e. lights on, blinds open  - Promote sleep, encourage patient's normal rest cycle i.e. lights off, TV off, minimize noise and interruptions  - Encourage family to assist in orientation and promotion of home routines  11/26/2024 1146 by Salma Jenkins, RN  Outcome: Progressing  11/26/2024 1146 by Salma Jenkins, RN  Outcome: Progressing

## 2024-11-26 NOTE — OCCUPATIONAL THERAPY NOTE
OCCUPATIONAL THERAPY TREATMENT NOTE - INPATIENT     Room Number: 3605/3605-A  Session: 1   Number of Visits to Meet Established Goals: 5    Presenting Problem: Tramatic Subarachnoid Hemorrhage    ASSESSMENT   Patient demonstrates excellent progress this session, goals  met at baseline .    Patient functions at baseline with toileting, upper body dressing, lower body dressing, grooming, bed mobility, transfers, static sitting balance, dynamic sitting balance, static standing balance, dynamic standing balance, maintaining seated position, functional standing tolerance, and energy conservation strategies.   Occupational Therapy will discharge patient at this time as all goals have been met at Indep.    Patient would benefit from no further OT at discharge.     History: Patient is a 44 year old male admitted on 11/17/2024: Presented after being found face down near his apt dx traumatic R frontal SAH, ETOH intoxication, CT with facial bone fx. Restrained 11/18-11/19. Transferred to ICU 11/21/24 due to worsening withdrawal symptoms and high CIWA scores.     WEIGHT BEARING RESTRICTION       Recommendations for nursing staff:   Transfers: Indep  Toileting location: toilet    TREATMENT SESSION:  Patient Start of Session: supine in bed for session    FUNCTIONAL TRANSFER ASSESSMENT  Sit to Stand: Edge of Bed  Edge of Bed: Independent  Chair: Not Tested  Toilet Transfer: Independent    BED MOBILITY  Rolling: Independent  Supine to Sit : Independent  Sit to Supine (OT): Independent  Scooting: Indep to EOB    BALANCE ASSESSMENT  Static Sitting: Independent  Static Standing: Independent    FUNCTIONAL ADL ASSESSMENT  Grooming Standing: Independent (at sink)  LB Dressing Seated: Independent (to isauro socks)  LB Dressing Standing: Independent (to pull up brief)  Toileting Seated: Independent (at std toilet for dinorah-care, t/f, and clothing management)  Toileting Standing: Not Tested    ACTIVITY TOLERANCE: vitals stable                          O2 SATURATIONS       EDUCATION PROVIDED  Family/Caregiver Education : Role of Occupational Therapy; Plan of Care  Family/Caregiver's Response to Education: Demonstrates Poor Carry Over to Information    Equipment used: none  Demonstrates functional use, Would benefit from additional trial      Exercises:    Exercises Repetitions Comments   Scapular elevation     Scapular retraction     Shoulder rolls     Shoulder flexion     Shoulder abduction     Shoulder internal/external rotation     Forward punch     Elbow flexion     Elbow extension     Forearm pronation/supination     Wrist flexion/extension     Gross grasp/fist pumps     Ankle pumps     Knee extension     Marching       Therapist comments: Pt reported fatigue at home, pt educated on work simplification and energy conservation for at home to assist with independence with fatigue at home.   Patient End of Session: In bed;With  staff;Needs met;Call light within reach;All patient questions and concerns addressed;Hospital anti-slip socks;Alarm set    SUBJECTIVE  Pt stated, \"I am ready to get moving.\"    PAIN ASSESSMENT  Ratin  Location: no pain at this time        OBJECTIVE  Precautions: Bed/chair alarm; needed;Restraints (-150)    AM-PAC ‘6-Clicks’ Inpatient Daily Activity Short Form  -   Putting on and taking off regular lower body clothing?: None  -   Bathing (including washing, rinsing, drying)?: None  -   Toileting, which includes using toilet, bedpan or urinal? : None  -   Putting on and taking off regular upper body clothing?: None  -   Taking care of personal grooming such as brushing teeth?: None  -   Eating meals?: None    AM-PAC Score:  Score: 24  Approx Degree of Impairment: 0%  Standardized Score (AM-PAC Scale): 57.54    PLAN  OT Device Recommendations: TBD  OT Treatment Plan: Balance activities;Energy conservation/work simplification techniques;ADL training;Functional transfer training;Endurance training;Patient/Family  education;Compensatory technique education  Rehab Potential : Fair  Frequency: 3-5x/week    OT Goals: ALL MET as of     OT Session Time: 30 minutes  Self-Care Home Management: 15 minutes  Therapeutic Activity: 15 minutes  Neuromuscular Re-education: 0 minutes  Therapeutic Exercise: 0 minutes  Cognitive Skills: 0 minutes  Sensory Integrative: 0 minutes  Orthotic Management and Trainin minutes  Can add/delete any of these

## 2024-11-27 LAB
ALBUMIN SERPL-MCNC: 3.7 G/DL (ref 3.2–4.8)
ALBUMIN/GLOB SERPL: 1.1 {RATIO} (ref 1–2)
ALP LIVER SERPL-CCNC: 89 U/L
ALT SERPL-CCNC: 41 U/L
ANION GAP SERPL CALC-SCNC: 5 MMOL/L (ref 0–18)
AST SERPL-CCNC: 62 U/L (ref ?–34)
BILIRUB SERPL-MCNC: 0.3 MG/DL (ref 0.3–1.2)
BUN BLD-MCNC: 6 MG/DL (ref 9–23)
CALCIUM BLD-MCNC: 9.4 MG/DL (ref 8.7–10.4)
CHLORIDE SERPL-SCNC: 104 MMOL/L (ref 98–112)
CO2 SERPL-SCNC: 26 MMOL/L (ref 21–32)
CREAT BLD-MCNC: 0.7 MG/DL
EGFRCR SERPLBLD CKD-EPI 2021: 117 ML/MIN/1.73M2 (ref 60–?)
ERYTHROCYTE [DISTWIDTH] IN BLOOD BY AUTOMATED COUNT: 17.8 %
GLOBULIN PLAS-MCNC: 3.4 G/DL (ref 2–3.5)
GLUCOSE BLD-MCNC: 91 MG/DL (ref 70–99)
HCT VFR BLD AUTO: 34.8 %
HGB BLD-MCNC: 10.9 G/DL
MAGNESIUM SERPL-MCNC: 1.8 MG/DL (ref 1.6–2.6)
MCH RBC QN AUTO: 28.4 PG (ref 26–34)
MCHC RBC AUTO-ENTMCNC: 31.3 G/DL (ref 31–37)
MCV RBC AUTO: 90.6 FL
OSMOLALITY SERPL CALC.SUM OF ELEC: 277 MOSM/KG (ref 275–295)
PLATELET # BLD AUTO: 466 10(3)UL (ref 150–450)
POTASSIUM SERPL-SCNC: 4 MMOL/L (ref 3.5–5.1)
PROT SERPL-MCNC: 7.1 G/DL (ref 5.7–8.2)
RBC # BLD AUTO: 3.84 X10(6)UL
SODIUM SERPL-SCNC: 135 MMOL/L (ref 136–145)
WBC # BLD AUTO: 7.1 X10(3) UL (ref 4–11)

## 2024-11-27 PROCEDURE — 99232 SBSQ HOSP IP/OBS MODERATE 35: CPT | Performed by: HOSPITALIST

## 2024-11-27 RX ORDER — MAGNESIUM OXIDE 400 MG/1
400 TABLET ORAL ONCE
Status: COMPLETED | OUTPATIENT
Start: 2024-11-27 | End: 2024-11-27

## 2024-11-27 NOTE — PROGRESS NOTES
[facility]   part of [facility]     Hospitalist Progress Note     Amado Medina Patient Status:  Inpatient    3/22/1980 MRN [id number]   Location [facility] 6NE-A Attending Abi Blackmon MD   Hosp Day # 10 PCP None Pcp     Chief Complaint:I am fine      Subjective:     Patient remains weak but improving.    Objective:    Review of Systems:   A comprehensive review of systems was completed; pertinent positive and negatives stated in subjective.    Vital signs:  Temp:  [97.8 °F (36.6 °C)-98.4 °F (36.9 °C)] 98.2 °F (36.8 °C)  Pulse:  [70-75] 72  Resp:  [14-18] 18  BP: ()/(64-80) 119/80  SpO2:  [96 %-99 %] 99 %    Physical Exam:    General: No acute distress  Respiratory: No wheezes, no rhonchi  Cardiovascular: S1, S2, regular rate and rhythm  Abdomen: Soft, Non-tender, non-distended, positive bowel sounds  Neuro: No new focal deficits.   Extremities: No edema      Diagnostic Data:    Labs:  Recent Labs   Lab 24  0345 24  0538 24  0633 24  0617 24  0653   WBC 9.0 5.8 7.1 5.5 7.1   HGB 10.2* 10.9* 10.7* 10.8* 10.9*   MCV 94.8 92.1 89.2 89.7 90.6   .0 319.0 399.0 408.0 466.0*       Recent Labs   Lab 24  0633 24  0617 24  0653   * 95 91   BUN <5* <5* 6*   CREATSERUM 0.61* 0.66* 0.70   CA 9.2 9.3 9.4   ALB 3.6 3.6 3.7    136 135*   K 3.7 3.9 4.0    104 104   CO2 24.0 26.0 26.0   ALKPHO 94 87 89   * 80* 62*   ALT 49 44 41   BILT 0.4 0.4 0.3   TP 6.9 7.1 7.1       Estimated Creatinine Clearance: 100.7 mL/min (based on SCr of 0.7 mg/dL).    No results for input(s): \"TROP\", \"TROPHS\", \"CK\" in the last 168 hours.    No results for input(s): \"PTP\", \"INR\" in the last 168 hours.                 Microbiology    No results found for this visit on 24.      Imaging: Reviewed in Epic.    Medications:    PHENobarbital  32.4 mg Oral Q8H ANGELIKA    thiamine  250 mg Intravenous Daily    Followed by    [START ON 2024]  thiamine  100 mg Intravenous Daily    enoxaparin  40 mg Subcutaneous Daily    water-oil emulsion   Topical BID    multivitamin  1 tablet Oral Daily    folic acid  1 mg Oral Daily    mupirocin   Topical BID       Assessment & Plan:      #Disposition  -Continue phenobarbital taper, plan for discharge by Friday    #s/p fall   #ETOH intoxication   #Suspect Dts  -transfered to icu on 11/21 for worsening withdrawal symptoms  -taper dose phenobarb complete on Friday; cont P.o. phenobarbital taper course, s/p IV phenobarb (11/21-11/24)  s/p high dosages of ativan  -S/p Precedex drip  -cont high-dose thiamine (11/24-) for possible Warnicke's encephalopathy, folic acid, multivitamin  -CIWA  -s/p IVF    #Non anion gap metabolic acidosis, improving  -s/p ivf    #Traumatic SAH  -neuro sx eval noted from 11/18, signed off w/ repeat scans/follow up in 2 weeeks     #Thrombocytopenia , due to BM suppression from ETOH most likely  -improving     #Hypokalemia , replace   #Hyponatremia   -replace and monitor     #elevated liver enzymes due to ETOH hepatitis, improving   #nasal bone fracture,  -wound care  -pain control   -?augmentin (11/17, will plan x5 d course)  -ENT to see, recommendations appreciated      Jes Callejas, DO    Supplementary Documentation:     Quality:  DVT Mechanical Prophylaxis:   SCDs,    DVT Pharmacologic Prophylaxis   Medication    enoxaparin (Lovenox) 40 MG/0.4ML SUBQ injection 40 mg                Code Status: Full Code  Alvarado: No urinary catheter in place  Alvarado Duration (in days):   Central line:    DILLON:     Discharge is dependent on: course  At this point Mr. Medina is expected to be discharge to: home    The 21st Century Cures Act makes medical notes like these available to patients in the interest of transparency. Please be advised this is a medical document. Medical documents are intended to carry relevant information, facts as evident, and the clinical opinion of the practitioner. The medical  note is intended as peer to peer communication and may appear blunt or direct. It is written in medical language and may contain abbreviations or verbiage that are unfamiliar.

## 2024-11-27 NOTE — PLAN OF CARE
Assumed patient care at 1930  Pt oriented x 3   Sinus on tele, room air   Vitals stable, denies pain   Ambulatory x 1 sba   Safety and seizure precautions in place  Not in any form of distress   Will continue poc     Problem: Patient/Family Goals  Goal: Patient/Family Long Term Goal  Description: Patient's Long Term Goal: Discharge with adequate resources     Interventions:  - Assistance from CM/SW if needed  - See additional Care Plan goals for specific interventions  Outcome: Progressing  Goal: Patient/Family Short Term Goal  Description: Patient's Short Term Goal: Remain comfortable    Interventions:   - PRN medications   -Non-pharmacological (Ice packs)  - See additional Care Plan goals for specific interventions  Outcome: Progressing     Problem: METABOLIC/FLUID AND ELECTROLYTES - ADULT  Goal: Electrolytes maintained within normal limits  Description: INTERVENTIONS:  - Monitor labs and rhythm and assess patient for signs and symptoms of electrolyte imbalances  - Administer electrolyte replacement as ordered  - Monitor response to electrolyte replacements, including rhythm and repeat lab results as appropriate  - Fluid restriction as ordered  - Instruct patient on fluid and nutrition restrictions as appropriate  Outcome: Progressing     Problem: HEMATOLOGIC - ADULT  Goal: Free from bleeding injury  Description: (Example usage: patient with low platelets)  INTERVENTIONS:  - Avoid intramuscular injections, enemas and rectal medication administration  - Ensure safe mobilization of patient  - Hold pressure on venipuncture sites to achieve adequate hemostasis  - Assess for signs and symptoms of internal bleeding  - Monitor lab trends  - Patient is to report abnormal signs of bleeding to staff  - Avoid use of toothpicks and dental floss  - Use electric shaver for shaving  - Use soft bristle tooth brush  - Limit straining and forceful nose blowing  Outcome: Progressing     Problem: SAFETY ADULT - FALL  Goal: Free from  fall injury  Description: INTERVENTIONS:  - Assess pt frequently for physical needs  - Identify cognitive and physical deficits and behaviors that affect risk of falls.  - [place] fall precautions as indicated by assessment.  - Educate pt/family on patient safety including physical limitations  - Instruct pt to call for assistance with activity based on assessment  - Modify environment to reduce risk of injury  - Provide assistive devices as appropriate  - Consider OT/PT consult to assist with strengthening/mobility  - Encourage toileting schedule  Outcome: Progressing     Problem: DISCHARGE PLANNING  Goal: Discharge to home or other facility with appropriate resources  Description: INTERVENTIONS:  - Identify barriers to discharge w/pt and caregiver  - Include patient/family/discharge partner in discharge planning  - Arrange for needed discharge resources and transportation as appropriate  - Identify discharge learning needs (meds, wound care, etc)  - Arrange for interpreters to assist at discharge as needed  - Consider post-discharge preferences of patient/family/discharge partner  - Complete POLST form as appropriate  - Assess patient's ability to be responsible for managing their own health  - Refer to Case Management Department for coordinating discharge planning if the patient needs post-hospital services based on physician/LIP order or complex needs related to functional status, cognitive ability or social support system  Outcome: Progressing     Problem: Altered Communication/Language Barrier  Goal: Patient/Family is able to understand and participate in their care  Description: Interventions:  - Assess communication ability and preferred communication style  - Implement communication aides and strategies  - Use visual cues when possible  - Listen attentively, be patient, do not interrupt  - Minimize distractions  - Allow time for understanding and response  - Establish method for patient to ask for  assistance (call light)  - Provide an  as needed  - Communicate barriers and strategies to overcome with those who interact with patient  Outcome: Progressing     Problem: Delirium  Goal: Minimize duration of delirium  Description: Interventions:  - Encourage use of hearing aids, eye glasses  - Promote highest level of mobility daily  - Provide frequent reorientation  - Promote wakefulness i.e. lights on, blinds open  - Promote sleep, encourage patient's normal rest cycle i.e. lights off, TV off, minimize noise and interruptions  - Encourage family to assist in orientation and promotion of home routines  Outcome: Progressing

## 2024-11-27 NOTE — PLAN OF CARE
Assumed patient care @ 07:30.  Alert and oriented x 4.  Chinese speaking.  Room air.  NSR on telemetry.  Denies pain.  Seizure precautions maintained.  Needs attended, call light within his reach.  Bed in lowest position, bed alarm is on.  Problem: Patient/Family Goals  Goal: Patient/Family Long Term Goal  Description: Patient's Long Term Goal: Discharge with adequate resources     Interventions:  - Assistance from CM/SW if needed  - See additional Care Plan goals for specific interventions  Outcome: Progressing  Goal: Patient/Family Short Term Goal  Description: Patient's Short Term Goal: Remain comfortable    Interventions:   - PRN medications   -Non-pharmacological (Ice packs)  - See additional Care Plan goals for specific interventions  Outcome: Progressing     Problem: METABOLIC/FLUID AND ELECTROLYTES - ADULT  Goal: Electrolytes maintained within normal limits  Description: INTERVENTIONS:  - Monitor labs and rhythm and assess patient for signs and symptoms of electrolyte imbalances  - Administer electrolyte replacement as ordered  - Monitor response to electrolyte replacements, including rhythm and repeat lab results as appropriate  - Fluid restriction as ordered  - Instruct patient on fluid and nutrition restrictions as appropriate  Outcome: Progressing     Problem: HEMATOLOGIC - ADULT  Goal: Free from bleeding injury  Description: (Example usage: patient with low platelets)  INTERVENTIONS:  - Avoid intramuscular injections, enemas and rectal medication administration  - Ensure safe mobilization of patient  - Hold pressure on venipuncture sites to achieve adequate hemostasis  - Assess for signs and symptoms of internal bleeding  - Monitor lab trends  - Patient is to report abnormal signs of bleeding to staff  - Avoid use of toothpicks and dental floss  - Use electric shaver for shaving  - Use soft bristle tooth brush  - Limit straining and forceful nose blowing  Outcome: Progressing     Problem: SAFETY ADULT -  FALL  Goal: Free from fall injury  Description: INTERVENTIONS:  - Assess pt frequently for physical needs  - Identify cognitive and physical deficits and behaviors that affect risk of falls.  - [place] fall precautions as indicated by assessment.  - Educate pt/family on patient safety including physical limitations  - Instruct pt to call for assistance with activity based on assessment  - Modify environment to reduce risk of injury  - Provide assistive devices as appropriate  - Consider OT/PT consult to assist with strengthening/mobility  - Encourage toileting schedule  Outcome: Progressing     Problem: DISCHARGE PLANNING  Goal: Discharge to home or other facility with appropriate resources  Description: INTERVENTIONS:  - Identify barriers to discharge w/pt and caregiver  - Include patient/family/discharge partner in discharge planning  - Arrange for needed discharge resources and transportation as appropriate  - Identify discharge learning needs (meds, wound care, etc)  - Arrange for interpreters to assist at discharge as needed  - Consider post-discharge preferences of patient/family/discharge partner  - Complete POLST form as appropriate  - Assess patient's ability to be responsible for managing their own health  - Refer to Case Management Department for coordinating discharge planning if the patient needs post-hospital services based on physician/LIP order or complex needs related to functional status, cognitive ability or social support system  Outcome: Progressing     Problem: Altered Communication/Language Barrier  Goal: Patient/Family is able to understand and participate in their care  Description: Interventions:  - Assess communication ability and preferred communication style  - Implement communication aides and strategies  - Use visual cues when possible  - Listen attentively, be patient, do not interrupt  - Minimize distractions  - Allow time for understanding and response  - Establish method for  patient to ask for assistance (call light)  - Provide an  as needed  - Communicate barriers and strategies to overcome with those who interact with patient  Outcome: Progressing     Problem: Delirium  Goal: Minimize duration of delirium  Description: Interventions:  - Encourage use of hearing aids, eye glasses  - Promote highest level of mobility daily  - Provide frequent reorientation  - Promote wakefulness i.e. lights on, blinds open  - Promote sleep, encourage patient's normal rest cycle i.e. lights off, TV off, minimize noise and interruptions  - Encourage family to assist in orientation and promotion of home routines  Outcome: Progressing

## 2024-11-28 LAB — MAGNESIUM SERPL-MCNC: 1.8 MG/DL (ref 1.6–2.6)

## 2024-11-28 PROCEDURE — 99232 SBSQ HOSP IP/OBS MODERATE 35: CPT | Performed by: HOSPITALIST

## 2024-11-28 RX ORDER — MAGNESIUM OXIDE 400 MG/1
400 TABLET ORAL ONCE
Status: COMPLETED | OUTPATIENT
Start: 2024-11-28 | End: 2024-11-28

## 2024-11-28 NOTE — SPIRITUAL CARE NOTE
Spiritual Care Visit Note    Patient Name: Amado Medina Date of Spiritual Care Visit: 24   : 3/22/1980 Primary Dx: Traumatic subarachnoid hemorrhage with unknown loss of consciousness status, initial encounter (HCC)       Referred By: Referral From: Other (Comment) (LOS list)    Spiritual Care Taxonomy:    Intended Effects: Aligning care plan with patient's values    Methods: Assist with spiritual/Mormon practices;Exploring hope;Offer emotional support;Encourage self care;Encourage self reflection;Encourage sharing of feelings;Encourage someone to recognize their strengths;Explore presence of God    Interventions: Share written prayer;Share words of hope and inspiration;Provde spiritual/Mormon resources    Visit Type/Summary:     - Spiritual Care: Consulted with RN prior to visit. Patient and family expressed appreciation for  visit. Visit was in Tongan. Amado remembered  from last week. He had thought about 's words about sobriety and AA and wanted to discuss how God / Higher Power could restore him to sanity and wellness and a sober life.  provided a 9 minute mediation from [employer] (Amado has this) entitled \"Meditacion guiada aceptacion total\" and he really enjoyed it and found peace.  remains available as needed for follow up.    Spiritual Care support can be requested via an Epic consult. For urgent/immediate needs, please contact the On Call  at: [place]: ext [phone]

## 2024-11-28 NOTE — PROGRESS NOTES
[facility]   part of [facility]     Hospitalist Progress Note     Amado Medina Patient Status:  Inpatient    3/22/1980 MRN [id number]   Location [facility] 6NE-A Attending Abi Blackmon MD   Hosp Day # 11 PCP None Pcp     Chief Complaint:I am fine      Subjective:     Pt more lucid, less signs of withdrawal.     Objective:    Review of Systems:   A comprehensive review of systems was completed; pertinent positive and negatives stated in subjective.    Vital signs:  Temp:  [98 °F (36.7 °C)-98.8 °F (37.1 °C)] 98.4 °F (36.9 °C)  Pulse:  [71-86] 79  Resp:  [14-16] 15  BP: (110-136)/(74-95) 114/81  SpO2:  [97 %-100 %] 97 %    Physical Exam:    General: No acute distress  Respiratory: No wheezes, no rhonchi  Cardiovascular: S1, S2, regular rate and rhythm  Abdomen: Soft, Non-tender, non-distended, positive bowel sounds  Neuro: No new focal deficits.   Extremities: No edema      Diagnostic Data:    Labs:  Recent Labs   Lab 24  0345 24  0538 24  0633 24  0617 24  0653   WBC 9.0 5.8 7.1 5.5 7.1   HGB 10.2* 10.9* 10.7* 10.8* 10.9*   MCV 94.8 92.1 89.2 89.7 90.6   .0 319.0 399.0 408.0 466.0*       Recent Labs   Lab 24  0633 24  0617 24  0653   * 95 91   BUN <5* <5* 6*   CREATSERUM 0.61* 0.66* 0.70   CA 9.2 9.3 9.4   ALB 3.6 3.6 3.7    136 135*   K 3.7 3.9 4.0    104 104   CO2 24.0 26.0 26.0   ALKPHO 94 87 89   * 80* 62*   ALT 49 44 41   BILT 0.4 0.4 0.3   TP 6.9 7.1 7.1       Estimated Creatinine Clearance: 100.7 mL/min (based on SCr of 0.7 mg/dL).    No results for input(s): \"TROP\", \"TROPHS\", \"CK\" in the last 168 hours.    No results for input(s): \"PTP\", \"INR\" in the last 168 hours.                 Microbiology    No results found for this visit on 24.      Imaging: Reviewed in Epic.    Medications:    [START ON 2024] thiamine  100 mg Intravenous Daily    enoxaparin  40 mg Subcutaneous Daily    water-oil  emulsion   Topical BID    multivitamin  1 tablet Oral Daily    folic acid  1 mg Oral Daily    mupirocin   Topical BID       Assessment & Plan:      #Disposition  -Continue phenobarbital taper, plan for discharge tomorrow     #s/p fall   #ETOH intoxication   #Suspect Dts  -transfered to icu on 11/21 for worsening withdrawal symptoms  -taper dose phenobarb complete on Friday; cont P.o. phenobarbital taper course, s/p IV phenobarb (11/21-11/24)  s/p high dosages of ativan  -S/p Precedex drip  -cont high-dose thiamine (11/24-) for possible Warnicke's encephalopathy, folic acid, multivitamin; will start oral thiamine on dc  -s/p IVF    #Non anion gap metabolic acidosis, improving  -s/p ivf    #Traumatic SAH  -neuro sx eval noted from 11/18, signed off w/ repeat scans/follow up in 2 weeeks     #Thrombocytopenia , due to BM suppression from ETOH most likely  -improving     #Hypokalemia , replace   #Hyponatremia   -replace and monitor     #elevated liver enzymes due to ETOH hepatitis, improving   #nasal bone fracture,  -wound care  -pain control   -?augmentin (11/17, will plan x5 d course)  -ENT to see, recommendations appreciated      Jes Callejas, DO    Supplementary Documentation:     Quality:  DVT Mechanical Prophylaxis:   SCDs,    DVT Pharmacologic Prophylaxis   Medication    enoxaparin (Lovenox) 40 MG/0.4ML SUBQ injection 40 mg                Code Status: Full Code  [place]: No urinary catheter in place  [place] Duration (in days):   Central line:    DILLON:     Discharge is dependent on: course  At this point Mr. Medina is expected to be discharge to: home    The 21st Century Cures Act makes medical notes like these available to patients in the interest of transparency. Please be advised this is a medical document. Medical documents are intended to carry relevant information, facts as evident, and the clinical opinion of the practitioner. The medical note is intended as peer to peer communication and may appear  blunt or direct. It is written in medical language and may contain abbreviations or verbiage that are unfamiliar.

## 2024-11-28 NOTE — PLAN OF CARE
Assumed care at 0730.  Patient is alert and oriented x4  Room air  Seizure precautions  Non cardiac electrolyte protocol, magnesium replaced.  NSR on tele  Lovenox for VTE  Continent of bowel and bladder, BRP, SBA  Regular diet  Denies any pain  Kyrgyz speaking only.  Will continue current plan of care.   Problem: METABOLIC/FLUID AND ELECTROLYTES - ADULT  Goal: Electrolytes maintained within normal limits  Description: INTERVENTIONS:  - Monitor labs and rhythm and assess patient for signs and symptoms of electrolyte imbalances  - Administer electrolyte replacement as ordered  - Monitor response to electrolyte replacements, including rhythm and repeat lab results as appropriate  - Fluid restriction as ordered  - Instruct patient on fluid and nutrition restrictions as appropriate  Outcome: Progressing     Problem: HEMATOLOGIC - ADULT  Goal: Free from bleeding injury  Description: (Example usage: patient with low platelets)  INTERVENTIONS:  - Avoid intramuscular injections, enemas and rectal medication administration  - Ensure safe mobilization of patient  - Hold pressure on venipuncture sites to achieve adequate hemostasis  - Assess for signs and symptoms of internal bleeding  - Monitor lab trends  - Patient is to report abnormal signs of bleeding to staff  - Avoid use of toothpicks and dental floss  - Use electric shaver for shaving  - Use soft bristle tooth brush  - Limit straining and forceful nose blowing  Outcome: Progressing     Problem: SAFETY ADULT - FALL  Goal: Free from fall injury  Description: INTERVENTIONS:  - Assess pt frequently for physical needs  - Identify cognitive and physical deficits and behaviors that affect risk of falls.  - [place] fall precautions as indicated by assessment.  - Educate pt/family on patient safety including physical limitations  - Instruct pt to call for assistance with activity based on assessment  - Modify environment to reduce risk of injury  - Provide assistive devices  as appropriate  - Consider OT/PT consult to assist with strengthening/mobility  - Encourage toileting schedule  Outcome: Progressing

## 2024-11-28 NOTE — PLAN OF CARE
Pt is A&O x4. VSS- NSR on tele. RA. Lungs clear. Pt denies pain/nausea/SOB. PIV to R forearm is SL. SubQ lovenox for VTE proph. General diet. Pt up with SBA.     Problem: Patient/Family Goals  Goal: Patient/Family Long Term Goal  Description: Patient's Long Term Goal: Discharge with adequate resources     Interventions:  - Assistance from CM/SW if needed  - See additional Care Plan goals for specific interventions  Outcome: Progressing  Goal: Patient/Family Short Term Goal  Description: Patient's Short Term Goal: Remain comfortable    Interventions:   - PRN medications   -Non-pharmacological (Ice packs)  - See additional Care Plan goals for specific interventions  Outcome: Progressing     Problem: METABOLIC/FLUID AND ELECTROLYTES - ADULT  Goal: Electrolytes maintained within normal limits  Description: INTERVENTIONS:  - Monitor labs and rhythm and assess patient for signs and symptoms of electrolyte imbalances  - Administer electrolyte replacement as ordered  - Monitor response to electrolyte replacements, including rhythm and repeat lab results as appropriate  - Fluid restriction as ordered  - Instruct patient on fluid and nutrition restrictions as appropriate  Outcome: Progressing     Problem: HEMATOLOGIC - ADULT  Goal: Free from bleeding injury  Description: (Example usage: patient with low platelets)  INTERVENTIONS:  - Avoid intramuscular injections, enemas and rectal medication administration  - Ensure safe mobilization of patient  - Hold pressure on venipuncture sites to achieve adequate hemostasis  - Assess for signs and symptoms of internal bleeding  - Monitor lab trends  - Patient is to report abnormal signs of bleeding to staff  - Avoid use of toothpicks and dental floss  - Use electric shaver for shaving  - Use soft bristle tooth brush  - Limit straining and forceful nose blowing  Outcome: Progressing     Problem: SAFETY ADULT - FALL  Goal: Free from fall injury  Description: INTERVENTIONS:  - Assess pt  frequently for physical needs  - Identify cognitive and physical deficits and behaviors that affect risk of falls.  - [place] fall precautions as indicated by assessment.  - Educate pt/family on patient safety including physical limitations  - Instruct pt to call for assistance with activity based on assessment  - Modify environment to reduce risk of injury  - Provide assistive devices as appropriate  - Consider OT/PT consult to assist with strengthening/mobility  - Encourage toileting schedule  Outcome: Progressing     Problem: DISCHARGE PLANNING  Goal: Discharge to home or other facility with appropriate resources  Description: INTERVENTIONS:  - Identify barriers to discharge w/pt and caregiver  - Include patient/family/discharge partner in discharge planning  - Arrange for needed discharge resources and transportation as appropriate  - Identify discharge learning needs (meds, wound care, etc)  - Arrange for interpreters to assist at discharge as needed  - Consider post-discharge preferences of patient/family/discharge partner  - Complete POLST form as appropriate  - Assess patient's ability to be responsible for managing their own health  - Refer to Case Management Department for coordinating discharge planning if the patient needs post-hospital services based on physician/LIP order or complex needs related to functional status, cognitive ability or social support system  Outcome: Progressing     Problem: Altered Communication/Language Barrier  Goal: Patient/Family is able to understand and participate in their care  Description: Interventions:  - Assess communication ability and preferred communication style  - Implement communication aides and strategies  - Use visual cues when possible  - Listen attentively, be patient, do not interrupt  - Minimize distractions  - Allow time for understanding and response  - Establish method for patient to ask for assistance (call light)  - Provide an  as  needed  - Communicate barriers and strategies to overcome with those who interact with patient  Outcome: Progressing     Problem: Delirium  Goal: Minimize duration of delirium  Description: Interventions:  - Encourage use of hearing aids, eye glasses  - Promote highest level of mobility daily  - Provide frequent reorientation  - Promote wakefulness i.e. lights on, blinds open  - Promote sleep, encourage patient's normal rest cycle i.e. lights off, TV off, minimize noise and interruptions  - Encourage family to assist in orientation and promotion of home routines  Outcome: Progressing

## 2024-11-29 VITALS
SYSTOLIC BLOOD PRESSURE: 119 MMHG | HEIGHT: 61 IN | OXYGEN SATURATION: 97 % | DIASTOLIC BLOOD PRESSURE: 86 MMHG | WEIGHT: 146 LBS | RESPIRATION RATE: 18 BRPM | BODY MASS INDEX: 27.56 KG/M2 | HEART RATE: 84 BPM | TEMPERATURE: 98 F

## 2024-11-29 LAB — MAGNESIUM SERPL-MCNC: 1.9 MG/DL (ref 1.6–2.6)

## 2024-11-29 PROCEDURE — 99239 HOSP IP/OBS DSCHRG MGMT >30: CPT | Performed by: HOSPITALIST

## 2024-11-29 RX ORDER — DOXEPIN HYDROCHLORIDE 50 MG/1
1 CAPSULE ORAL DAILY
Qty: 30 TABLET | Refills: 2 | Status: SHIPPED | OUTPATIENT
Start: 2024-11-30 | End: 2025-02-28

## 2024-11-29 RX ORDER — MELATONIN
100 DAILY
Qty: 90 TABLET | Refills: 0 | Status: SHIPPED | OUTPATIENT
Start: 2024-11-29 | End: 2025-02-27

## 2024-11-29 RX ORDER — DOXEPIN HYDROCHLORIDE 50 MG/1
1 CAPSULE ORAL DAILY
Qty: 30 TABLET | Refills: 2 | Status: SHIPPED | OUTPATIENT
Start: 2024-11-30 | End: 2024-11-29

## 2024-11-29 RX ORDER — FOLIC ACID 1 MG/1
1 TABLET ORAL DAILY
Qty: 30 TABLET | Refills: 2 | Status: SHIPPED | OUTPATIENT
Start: 2024-11-30 | End: 2025-02-28

## 2024-11-29 RX ORDER — FOLIC ACID 1 MG/1
1 TABLET ORAL DAILY
Qty: 30 TABLET | Refills: 2 | Status: SHIPPED | OUTPATIENT
Start: 2024-11-30 | End: 2024-11-29

## 2024-11-29 RX ORDER — MELATONIN
100 DAILY
Qty: 90 TABLET | Refills: 0 | Status: SHIPPED | OUTPATIENT
Start: 2024-11-29 | End: 2024-11-29

## 2024-11-29 NOTE — PLAN OF CARE
Assumed care at 0730  A/Ox4. VIDYA. NSR on tele  Denies pain   Dc home today    Problem: Patient/Family Goals  Goal: Patient/Family Long Term Goal  Description: Patient's Long Term Goal: Discharge with adequate resources     Interventions:  - Assistance from CM/SW if needed  - See additional Care Plan goals for specific interventions  Outcome: Progressing  Goal: Patient/Family Short Term Goal  Description: Patient's Short Term Goal: Remain comfortable    Interventions:   - PRN medications   -Non-pharmacological (Ice packs)  - See additional Care Plan goals for specific interventions  Outcome: Progressing     Problem: METABOLIC/FLUID AND ELECTROLYTES - ADULT  Goal: Electrolytes maintained within normal limits  Description: INTERVENTIONS:  - Monitor labs and rhythm and assess patient for signs and symptoms of electrolyte imbalances  - Administer electrolyte replacement as ordered  - Monitor response to electrolyte replacements, including rhythm and repeat lab results as appropriate  - Fluid restriction as ordered  - Instruct patient on fluid and nutrition restrictions as appropriate  Outcome: Progressing     Problem: HEMATOLOGIC - ADULT  Goal: Free from bleeding injury  Description: (Example usage: patient with low platelets)  INTERVENTIONS:  - Avoid intramuscular injections, enemas and rectal medication administration  - Ensure safe mobilization of patient  - Hold pressure on venipuncture sites to achieve adequate hemostasis  - Assess for signs and symptoms of internal bleeding  - Monitor lab trends  - Patient is to report abnormal signs of bleeding to staff  - Avoid use of toothpicks and dental floss  - Use electric shaver for shaving  - Use soft bristle tooth brush  - Limit straining and forceful nose blowing  Outcome: Progressing     Problem: SAFETY ADULT - FALL  Goal: Free from fall injury  Description: INTERVENTIONS:  - Assess pt frequently for physical needs  - Identify cognitive and physical deficits and  behaviors that affect risk of falls.  - Wilsey fall precautions as indicated by assessment.  - Educate pt/family on patient safety including physical limitations  - Instruct pt to call for assistance with activity based on assessment  - Modify environment to reduce risk of injury  - Provide assistive devices as appropriate  - Consider OT/PT consult to assist with strengthening/mobility  - Encourage toileting schedule  Outcome: Progressing     Problem: DISCHARGE PLANNING  Goal: Discharge to home or other facility with appropriate resources  Description: INTERVENTIONS:  - Identify barriers to discharge w/pt and caregiver  - Include patient/family/discharge partner in discharge planning  - Arrange for needed discharge resources and transportation as appropriate  - Identify discharge learning needs (meds, wound care, etc)  - Arrange for interpreters to assist at discharge as needed  - Consider post-discharge preferences of patient/family/discharge partner  - Complete POLST form as appropriate  - Assess patient's ability to be responsible for managing their own health  - Refer to Case Management Department for coordinating discharge planning if the patient needs post-hospital services based on physician/LIP order or complex needs related to functional status, cognitive ability or social support system  Outcome: Progressing

## 2024-11-29 NOTE — DISCHARGE SUMMARY
[place] HOSPITALIST  DISCHARGE SUMMARY     Amado Medina Patient Status:  Inpatient    3/22/1980 MRN [id number]   Location [facility] 3NE-A Attending Jes Callejas, DO   Hosp Day # 12 PCP None Pcp     Date of Admission: 2024  Date of Discharge:   2024    Discharge Disposition: Home or Self Care    Discharge Diagnosis:  #s/p fall   #ETOH intoxication   #Suspect Dts  -transfered to icu on  for worsening withdrawal symptoms  -taper dose phenobarb complete on Friday; cont P.o. phenobarbital taper course, s/p IV phenobarb (-)  s/p high dosages of ativan  -S/p Precedex drip  -cont high-dose thiamine (-) for possible Warnicke's encephalopathy, folic acid, multivitamin; will start oral thiamine on dc  -s/p IVF     #Non anion gap metabolic acidosis, improving  -s/p ivf     #Traumatic SAH  -neuro sx eval noted from , signed off w/ repeat scans/follow up in 2 weeeks     #Thrombocytopenia , due to BM suppression from ETOH most likely  -improving      #Hypokalemia , replace   #Hyponatremia   -replace and monitor      #elevated liver enzymes due to ETOH hepatitis, improving   #nasal bone fracture,  -wound care  -pain control   -?augmentin (, will plan x5 d course)  -ENT to see, recommendations appreciated    History of Present Illness: Amado Medina is a 44 year old male Who is Uzbek-speaking and an  has been used to obtain history, with no underlying past medical history presented to the emergency room after he was found facedown near his apartment.  Per patient he had been drinking tequila and beer last night and fell near his home.  He denies any headache, nausea, vomiting, chest pain or shortness of breath.  He repeatedly says \"everything is fine I am okay\" .       Brief Synopsis: pt admitted s/p fall while intoxicated. Found to have SAH, nasal bone fracture, seen by neuro sx and reportedly seen by ENT (no note in chart, seen prior my involvement in  case). Pt had no further issues from a neuro/ent standpoint. Pt completed augmentin x5 days per ENT recs. Pt had severe alcohol withdrawal and ended up in icu w/ iv phenobarb, precedex gtt. Pt eventually tapered off of phenobarb. Treatment w/ thiamine high dose for possible Wernicke, will recommend more thiamine on discharge.     Lace+ Score: 53  59-90 High Risk  29-58 Medium Risk  0-28   Low Risk  Patient was referred to the [facility].    TCM Follow-Up Recommendation:  LACE 29-58: Moderate Risk of readmission after discharge from the hospital.      Procedures during hospitalization:       Incidental or significant findings and recommendations (brief descriptions):      Lab/Test results pending at Discharge:       Consultants:  ENT, Neuro sx, Critical care    Discharge Medication List:     Discharge Medications        START taking these medications        Instructions Prescription details   folic acid 1 MG Tabs  Commonly known as: Folvite  Start taking on: November 30, 2024      Take 1 tablet (1 mg total) by mouth daily.   Stop taking on: February 28, 2025  Quantity: 30 tablet  Refills: 2     multivitamin Tabs  Start taking on: November 30, 2024      Take 1 tablet by mouth daily.   Stop taking on: February 28, 2025  Quantity: 30 tablet  Refills: 2     thiamine 100 MG Tabs  Commonly known as: Vitamin B1      Take 1 tablet (100 mg total) by mouth daily.   Stop taking on: February 27, 2025  Quantity: 90 tablet  Refills: 0               Where to Get Your Medications        These medications were sent to [id number] - [place], [place] - [address], [phone], [phone]  [address] [address]      Phone: [phone]   folic acid 1 MG Tabs  multivitamin Tabs  thiamine 100 MG Tabs         [place]:     Follow-up appointment:   [facility]  [address]  [facility]  [address]  [phone]  Schedule an appointment as soon as possible for a visit      Appointments for Next 30 Days 2024 - 2024      None            Vital signs:  Temp:  [97.9 °F (36.6 °C)-98.3 °F (36.8 °C)] 97.9 °F (36.6 °C)  Pulse:  [65-86] 84  Resp:  [16-18] 18  BP: (106-119)/(69-86) 119/86  SpO2:  [94 %-100 %] 97 %    Physical Exam:    General: No acute distress   Lungs: clear to auscultation  Cardiovascular: S1, S2  Abdomen: Soft      -----------------------------------------------------------------------------------------------  PATIENT DISCHARGE INSTRUCTIONS: See electronic chart    Jes Callejas,     Total time spent on discharge plannin minutes     The  [employer] Cures Act makes medical notes like these available to patients in the interest of transparency. Please be advised this is a medical document. Medical documents are intended to carry relevant information, facts as evident, and the clinical opinion of the practitioner. The medical note is intended as peer to peer communication and may appear blunt or direct. It is written in medical language and may contain abbreviations or verbiage that are unfamiliar.

## 2024-11-29 NOTE — PLAN OF CARE
End of shift note:    Pt is A&Ox4-Armenian speaking only - utilized. Pt denied any pain overnight, appeared to sleep well. On tele, SR. Room air- no SOB.  Eating and drinking w/ no issues. Regular diet. All safety precautions in place-plan for probable discharge home in am. Pt updated on POC, all questions answered.

## 2024-12-03 ENCOUNTER — PATIENT OUTREACH (OUTPATIENT)
Dept: CASE MANAGEMENT | Age: 44
End: 2024-12-03

## 2024-12-03 NOTE — PROGRESS NOTES
Attempted to contact pt for condition update however no answer. Call rang twice and then disconnected. Unable to leave a VM at this time.  NCM to try again at a later time.

## 2024-12-04 ENCOUNTER — PATIENT OUTREACH (OUTPATIENT)
Dept: CASE MANAGEMENT | Age: 44
End: 2024-12-04

## 2024-12-04 NOTE — PROGRESS NOTES
Hospital follow up.    No stroke follow up or general neurology follow up per stroke navigator.    TCC request.    Patient will call back to schedule.  Confirmed with patient.    Closing encounter.

## 2024-12-04 NOTE — CDS QUERY
Dear Dr. Callejas,    Can the patient's altered mental status be further defined?   (   ) Acute toxic metabolic encephalopathy   (   ) Delirium tremens (DTs)  (  ) Both Acute toxic metabolic encephalopathy & DTs  (   ) Other - please specify:   ______________________________________________________________________  Clinical Indicators:     11/17 ED: Traumatic subarachnoid hemorrhage with unknown loss of consciousness status   Alcoholic intoxication without complication     11/21 Critical Care Consult: Toxic metabolic encephalopathy secondary to alcohol withdrawal, CIWA as high as 28 on the floor today     11/22 Hospitalist: remains on 4-point restraints and Posey. Patient remains confused.   #s/p fall #ETOH intoxication   #Suspect Dts - transfered to icu on 11/21 for worsening withdrawal symptoms    11/25 Hospitalist: Patient's mentation continues to improve. No longer requiring restrains.     11/29 Discharge Summary:   #s/p fall   #ETOH intoxication   #Suspect Dts - cont high-dose thiamine (11/24-) for possible Warnicke's encephalopathy   #Non anion gap metabolic acidosis, improving   #Traumatic SAH   #Thrombocytopenia due to BM suppression from ETOH most likely   #elevated liver enzymes due to ETOH hepatitis   #nasal bone fracture      Risk Factors: ETOH Abuse    Treatment: CIWA protocol    Use of terms such as suspected, possible, or probable (associated with a specific diagnosis that is being evaluated, monitored, or treated as if it exists) are acceptable and can be coded in the inpatient setting, when documented at the time of discharge.      For questions regarding this query, please contact Lead Clinical Documentation :    Flora Torres RN, CCDS        Cell# [phone]          Thank you!

## (undated) NOTE — LETTER
Raymond Mike M.D., F.A.C.S.  Jorge Montana M.D., F.A.C.S.  Juanpablo Bonilla M.D.   Rigo Ling M.D., F.A.C.S.  ALLAN Keita M.D., F.A.C.S.   Hector Lopez M.D., F.A.C.S.  Israel Ochoa M.D.    ALLAN Kwon M.D.   ALLAN Chinchilla M.D., M.B.A.  Duong Heredia M.D.  ALLAN Iniguez M.D., F.A.C.S.  Telly Sprague M.D., F.A.C.S.   ALLAN Whitaker M.D., F.A.C.S., F.A.C.C. Uriel Church M.D.  Manolo Cuello M.D.    JUAN PABLO Kwong D.O., F.A.C.S.  Michael Serrano M.D.   Jai Flaherty M.D.  Pillo Su M.D.    Rick Ramsey M.D., F.A.C.S.        Welcome to [facility]    As you contemplate possible surgical treatment, it is very important to us that you understand fully what is being discussed, that all of your questions have been answered, and that your options for treatment have been fully explained.    To that end, on the following page we will ask you some questions to make certain that you understand everything which has been explained to you. Included in this understanding is that there are both surgical and nonsurgical treatments available for you, that you have options regarding where your care is given, and what doctors are involved in your care. Included in these options would, of course, be the option to elect for no treatment whatsoever. We especially want to be sure that you have had a chance to have all of your questions and concerns answered. If there are any issues which have not been adequately addressed, we ask you to bring them forward so that we can thoroughly address them.    A patient who is fully informed and understands their condition and options for treatment, as well as potential adverse effects of treatment, is going to be a patient who receives the most benefit out of care rendered.  Our goal in addition to providing excellent surgical care is to provide the necessary information to you and your family in order to make decisions which are appropriate relative to your own care.    Please take the time necessary to read and answer the questions on the next page. Again, if you have any questions, bring them forward and we will certainly address them.    Sincerely,    [facility]    Date:___________________ _______________________ _______________________       Printed Patient Name  Signature of Patient    Date:___________________ _______________________ _______________________       Printed Witness Name  Signature of Witness    _______________________       Relationship of Witness to Patient        Consent Form Page: 1 of 2  [address] [facility] * [facility] [address] * [place] [address] * [phone] / [phone] *  Fax [phone] * Billing Fax [phone] Version: 9/9/2024    [facility]  Supplemental Consent Form    A [facility] surgeon has met with me and explained the matter of my illness, and what treatments might be available to improve my condition. As a result of that conversation, I understand the following:    A [facility] surgeon met with me and explained, in detail, the nature of my condition for which surgery is being contemplated. The procedure being performed is:       Yes _____ No _____    A [facility] surgeon met with me and explained to me that there are alternatives to surgery which may include no surgery, medical therapy, or interventional treatment, among other options and the risks and benefits of the different treatment options:Yes _____ No _____    A [facility] surgeon has explained to me that if I should desire, he/she is willing to explain my case and the surgical and non-surgical options to family members:            Yes _____ No _____    A [facility] surgeon has answered all of my questions regarding the topics we have  discussed. I have been invited to ask more questions:  Yes _____ No _____    A [facility] surgeon has explained to me that if I seek other options or wish treatment at elsewhere, that his/her office will assist me in making such recommendations:  Yes _____ No _____    A [facility] surgeon has explained to me that death, risk of bleeding, stroke, multi-organ failure, heart attack and/or other complications are risks for the proposed surgical procedure:        Yes _____ No _____    A [facility] surgeon has explained to me that I have the right to cancel or postpone the surgery at any time prior to the start of surgery:    Yes _____ No _____    The nature and options for treatment for my condition has been explained to me, in detail, by a [facility] surgeon and all questions have been answered to my satisfaction. I understand that I am not required to undergo surgery, and further, that if I so desire, I could have surgery accomplished by another surgeon or at another institution. I understand and accept that which has been explained to me. I am able to make my decisions knowingly and willingly based on the data.    Date:___________________ _______________________ _______________________       Printed Patient Name  Signature of Patient    Date:___________________ _______________________ _______________________       Printed Witness Name  Signature of Witness    _______________________   Relationship of Witness to Patient          Consent Form Page: 3 of  2  [address] [facility] * [address] * [place] [address] * [phone] / [phone] *  Fax [phone] * William Fax [phone] Version: 9/9/2024

## (undated) NOTE — LETTER
[facility] 6NE-A  [address] [facility] [address] [address]  [phone]    Consentimiento para la transfusión de radha    En el curso de pickard tratamiento, puede ser necesario administrarle patrick transfusión de radha o componentes de la radha. Rony formulario da la información básica relacionada a rony procedimiento y, si está firmado por bridger, autoriza pickard administración.  Firmando rony formulario, bridger acepta que el profesional médico que ha solicitado o la persona designada kiser respondido a todas mayelin preguntas sobre la administración de la radha o de los productos de la radha.    Descripción del procedimiento  La radha se introduce en patrick de mayelin venas, habitualmente en el brazo, mediante patrick aguja esterilizada desechable. La cantidad de radha transfundida, y si la transfusión será de radha o de componentes de la radha, es patrick decisión que tomará el médico basándose en mayelin necesidades particulares.    Riesgos  La transfusión es un procedimiento habitual de bajo riesgo.  CON FRECUENCIA HAY REACCIONES LEVES Y TEMPORALES, incluyendo un ligero hematoma, hinchazón o reacción local en el área donde la aguja atraviesa la piel, o patrick reacción no grave al propio material transfundido, incluyendo el dolor de jonny, fiebre o patrick reacción leve en la piel, olinda un sarpullido.  Las reacciones graves son posibles, aunque poco probables, e incluyen patrick reacción alérgica grave (choque) y la destrucción (hemólisis) de las células sanguíneas transfundidas.  Entre las enfermedades infecciosas que se sabe que se transmiten por la transfusión de radha, se incluyen ciertos tipos de hepatitis viral (infección del hígado por un virus), la infección por el virus de la inmunodeficiencia humana (VIH-1,2), patrick infección viral conocida por causar el síndrome de inmunodeficiencia adquirida (SIDA) y ciertas enfermedades bacterianas, virales y parasitarias. Aunque existe un riesgo mínimo de contraer patrick enfermedad infecciosa por medio  de la radha transfundida, según la tim federal y estatal, se kirk tomado todas las atenciones necesarias en la selección y análisis de los donantes para evitar la transmisión de enfermedades.    Alternativas  Si la pérdida de radha supone patrick amenaza grave artie pickard tratamiento, NO EXISTE ALTERNATIVA EFICAZ A LA TRANSFUSIÓN DE RADHA. Sin embargo, si tiene más preguntas al respecto, pickard proveedor le explicará detalladamente las alternativas si aún no lo kiser hecho.    Yo, _____________________, he leído/hecho que me lean lo de arriba. Entiendo las cuestiones que influyen en la decisión de autorizar o no patrick transfusión de radha o componentes de la radha. No tengo ninguna pregunta que no se haya respondido a mi entera satisfacción. Por la presente doy mi consentimiento para la transfusión que mi médico considere necesaria o aconsejable en el curso de mi tratamiento.      (Firma del paciente o de la persona responsable si es maria l de edad,  paciente incompetente o inconsciente).  (Nombre del paciente o de la persona responsable en letra de molde).       (Relación con el paciente si no es él mismo).  (Fecha y hora).        (Firma del testigo).  (Nombre del testigo en letra de molde).     Language line (intérprete)   Consentimiento telefónico/verbal/en video    _________________________        _________________________  (Firma del moshe testigo         (Nombre en letra de molde del   consentimiento telefónico/verbal/en video)       moshe testigo)           Patient Name: Amado Carol     : 3/22/1980                 Printed: 2024     Medical Record #: [id number]    Rev: 2023